# Patient Record
Sex: FEMALE | Race: WHITE | NOT HISPANIC OR LATINO | Employment: OTHER | URBAN - METROPOLITAN AREA
[De-identification: names, ages, dates, MRNs, and addresses within clinical notes are randomized per-mention and may not be internally consistent; named-entity substitution may affect disease eponyms.]

---

## 2017-06-27 ENCOUNTER — CONVERSION ENCOUNTER (OUTPATIENT)
Dept: GENERAL RADIOLOGY | Facility: HOSPITAL | Age: 57
End: 2017-06-27

## 2018-06-28 ENCOUNTER — CONVERSION ENCOUNTER (OUTPATIENT)
Dept: GENERAL RADIOLOGY | Facility: HOSPITAL | Age: 58
End: 2018-06-28

## 2018-07-13 ENCOUNTER — OFFICE VISIT CONVERTED (OUTPATIENT)
Dept: FAMILY MEDICINE CLINIC | Facility: CLINIC | Age: 58
End: 2018-07-13
Attending: FAMILY MEDICINE

## 2019-07-15 ENCOUNTER — HOSPITAL ENCOUNTER (OUTPATIENT)
Dept: OTHER | Facility: HOSPITAL | Age: 59
Discharge: HOME | End: 2019-07-15
Attending: FAMILY MEDICINE

## 2019-07-15 ENCOUNTER — OFFICE VISIT CONVERTED (OUTPATIENT)
Dept: FAMILY MEDICINE CLINIC | Facility: CLINIC | Age: 59
End: 2019-07-15
Attending: FAMILY MEDICINE

## 2019-07-15 LAB
ANION GAP SERPL CALC-SCNC: 16 MMOL/L (ref 8–19)
BUN SERPL-MCNC: 9 MG/DL (ref 5–25)
BUN/CREAT SERPL: 16 {RATIO} (ref 6–20)
CALCIUM SERPL-MCNC: 9.5 MG/DL (ref 8.7–10.4)
CHLORIDE SERPL-SCNC: 101 MMOL/L (ref 99–111)
CHOLEST SERPL-MCNC: 194 MG/DL (ref 107–200)
CHOLEST/HDLC SERPL: 2.6 {RATIO} (ref 3–6)
CONV CO2: 27 MMOL/L (ref 22–32)
CREAT UR-MCNC: 0.55 MG/DL (ref 0.5–0.9)
EST. AVERAGE GLUCOSE BLD GHB EST-MCNC: 111 MG/DL
GFR SERPLBLD BASED ON 1.73 SQ M-ARVRAT: >60 ML/MIN/{1.73_M2}
GLUCOSE SERPL-MCNC: 107 MG/DL (ref 65–99)
HBA1C MFR BLD: 5.5 % (ref 3.5–5.7)
HDLC SERPL-MCNC: 74 MG/DL (ref 40–60)
LDLC SERPL CALC-MCNC: 109 MG/DL (ref 70–100)
OSMOLALITY SERPL CALC.SUM OF ELEC: 287 MOSM/KG (ref 273–304)
POTASSIUM SERPL-SCNC: 4.6 MMOL/L (ref 3.5–5.3)
SODIUM SERPL-SCNC: 139 MMOL/L (ref 135–147)
TRIGL SERPL-MCNC: 57 MG/DL (ref 40–150)
VLDLC SERPL-MCNC: 11 MG/DL (ref 5–37)

## 2019-08-12 ENCOUNTER — HOSPITAL ENCOUNTER (OUTPATIENT)
Dept: GENERAL RADIOLOGY | Facility: HOSPITAL | Age: 59
Discharge: HOME | End: 2019-08-12
Attending: FAMILY MEDICINE

## 2020-04-01 ENCOUNTER — OFFICE VISIT CONVERTED (OUTPATIENT)
Dept: FAMILY MEDICINE CLINIC | Facility: CLINIC | Age: 60
End: 2020-04-01
Attending: INTERNAL MEDICINE

## 2020-04-01 ENCOUNTER — CONVERSION ENCOUNTER (OUTPATIENT)
Dept: FAMILY MEDICINE CLINIC | Facility: CLINIC | Age: 60
End: 2020-04-01

## 2020-07-21 ENCOUNTER — HOSPITAL ENCOUNTER (OUTPATIENT)
Dept: OTHER | Facility: HOSPITAL | Age: 60
Discharge: HOME | End: 2020-07-21
Attending: INTERNAL MEDICINE

## 2020-07-21 ENCOUNTER — OFFICE VISIT CONVERTED (OUTPATIENT)
Dept: FAMILY MEDICINE CLINIC | Facility: CLINIC | Age: 60
End: 2020-07-21
Attending: INTERNAL MEDICINE

## 2020-07-21 LAB
ALBUMIN SERPL-MCNC: 4.7 G/DL (ref 3.5–5)
ALBUMIN/GLOB SERPL: 1.8 {RATIO} (ref 1.4–2.6)
ALP SERPL-CCNC: 76 U/L (ref 43–160)
ALT SERPL-CCNC: 14 U/L (ref 10–40)
ANION GAP SERPL CALC-SCNC: 18 MMOL/L (ref 8–19)
AST SERPL-CCNC: 20 U/L (ref 15–50)
BILIRUB SERPL-MCNC: 1.16 MG/DL (ref 0.2–1.3)
BUN SERPL-MCNC: 8 MG/DL (ref 5–25)
BUN/CREAT SERPL: 14 {RATIO} (ref 6–20)
CALCIUM SERPL-MCNC: 9.6 MG/DL (ref 8.7–10.4)
CHLORIDE SERPL-SCNC: 99 MMOL/L (ref 99–111)
CHOLEST SERPL-MCNC: 194 MG/DL (ref 107–200)
CHOLEST/HDLC SERPL: 2.6 {RATIO} (ref 3–6)
CONV CO2: 25 MMOL/L (ref 22–32)
CONV TOTAL PROTEIN: 7.3 G/DL (ref 6.3–8.2)
CREAT UR-MCNC: 0.57 MG/DL (ref 0.5–0.9)
GFR SERPLBLD BASED ON 1.73 SQ M-ARVRAT: >60 ML/MIN/{1.73_M2}
GLOBULIN UR ELPH-MCNC: 2.6 G/DL (ref 2–3.5)
GLUCOSE SERPL-MCNC: 103 MG/DL (ref 65–99)
HDLC SERPL-MCNC: 76 MG/DL (ref 40–60)
LDLC SERPL CALC-MCNC: 108 MG/DL (ref 70–100)
OSMOLALITY SERPL CALC.SUM OF ELEC: 283 MOSM/KG (ref 273–304)
POTASSIUM SERPL-SCNC: 4.8 MMOL/L (ref 3.5–5.3)
SODIUM SERPL-SCNC: 137 MMOL/L (ref 135–147)
TRIGL SERPL-MCNC: 52 MG/DL (ref 40–150)
VLDLC SERPL-MCNC: 10 MG/DL (ref 5–37)

## 2020-10-15 ENCOUNTER — HOSPITAL ENCOUNTER (OUTPATIENT)
Dept: GENERAL RADIOLOGY | Facility: HOSPITAL | Age: 60
Discharge: HOME | End: 2020-10-15
Attending: INTERNAL MEDICINE

## 2021-03-16 ENCOUNTER — HOSPITAL ENCOUNTER (OUTPATIENT)
Dept: VACCINE CLINIC | Facility: HOSPITAL | Age: 61
Discharge: HOME | End: 2021-03-16
Attending: INTERNAL MEDICINE

## 2021-03-24 ENCOUNTER — OFFICE VISIT CONVERTED (OUTPATIENT)
Dept: FAMILY MEDICINE CLINIC | Facility: CLINIC | Age: 61
End: 2021-03-24
Attending: STUDENT IN AN ORGANIZED HEALTH CARE EDUCATION/TRAINING PROGRAM

## 2021-03-24 ENCOUNTER — CONVERSION ENCOUNTER (OUTPATIENT)
Dept: FAMILY MEDICINE CLINIC | Facility: CLINIC | Age: 61
End: 2021-03-24

## 2021-04-06 ENCOUNTER — HOSPITAL ENCOUNTER (OUTPATIENT)
Dept: VACCINE CLINIC | Facility: HOSPITAL | Age: 61
Discharge: HOME | End: 2021-04-06
Attending: INTERNAL MEDICINE

## 2021-05-10 NOTE — H&P
History and Physical      Patient Name: Kasey Soler   Patient ID: [id number]   Sex: Female   YOB: 1960    Primary Care Provider: Kar Castro MD    Visit Date: March 24, 2021    Provider: Kar Castro MD   Location: [facility]   Location Address: [address] [place] [address], [address]  [place], [place]  [id number]   Location Phone: [phone]          Chief Complaint     New pt here today to est care       History Of Present Illness  Kasey Soler is a 60 year old /White female who presents for evaluation and treatment of:      60 years old female with past medical history of seasonal allergies, recurrent sinusitis, comes to the clinic today to establish care/complaining of sinus pressure and follow-up on chronic conditions.    Seasonal allergies; patient is only taking Zyrtec at this time.    Patient is complaining of few days history of pressure at the maxillary and frontal sinuses, denies any ear pain, mild nasal discharge intermittently but no cough, fever, chest pain or shortness of breath.    Patient is a retired nurse.  Physically very active currently, denies any chest pain or shortness of breath on exertion.       Past Medical History  Disease Name Date Onset Notes   *No Pertinent Past Medical History --  --    History of gestational diabetes 06/15/2016 --    Screening for colon cancer --  hemoccult NL 7/2018   Screening Mammogram 8/2019 normal         Past Surgical History  Procedure Name Date Notes   *Other 4/1/2015 basal cell removed   Appendectomy 1995 --    Hysterectomy 1995 --          Medication List  Name Date Started Instructions   Zyrtec 10 mg oral tablet  take 1 tablet (10 mg) by oral route once daily         Allergy List  Allergen Name Date Reaction Notes   NO KNOWN DRUG ALLERGIES --  --  --        Allergies Reconciled  Family Medical History  Disease Name Relative/Age Notes   Lung Neoplasm, Malignant Grandfather (paternal)/   --    Family history of  kidney disease  --          Reproductive History  Menstrual   Age Menarche: 11   Pregnancy Summary   Total Pregnancies: 2 Full Term: 1 Premature: 0   Ab Induced: 0 Ab Spontaneous: 0 Ectopics: 0   Multiples: 0 Livin         Social History  Finding Status Start/Stop Quantity Notes   Alcohol Never --/-- --  --     --  --/-- --  --    Minimal Amount of Exercise (Once weekly or less) --  --/-- --  --    No known infection risk --  --/-- --  --    Tobacco Never --/-- --  --          Immunizations  NameDate Admin Mfg Trade Name Lot Number Route Inj VIS Given VIS Publication   Hepatitis A1 SKB HAVRIX-ADULT [id number] IM LD 10/29/2018 2016   Comments: Pt tolerated well.   Hepatitis A02018 SKB HAVRIX-ADULT  IM RD 2018 10/25/2011   Comments: tolerated well   Xhvqxyedf04/2019 SKB Fluzone Quadrivalent  NE NE 10/01/2019    Comments:    Zaroeizftzze81/2013 MSD PNEUMOVAX 23  IM NE 2014 10/06/2009   Comments:    [id number] SKB BOOSTRIX  IM NE 2014   Comments:          Review of Systems  · Constitutional  o Denies  o : fatigue, fever  · Eyes  o Denies  o : discharge from eye, redness  · HENT  o Admits  o : Sinus congestion, sinus pressure  o Denies  o : headaches,   · Cardiovascular  o Denies  o : chest pain, palpitations  · Respiratory  o Denies  o : shortness of breath, wheezing, cough  · Gastrointestinal  o Denies  o : vomiting, diarrhea, constipation  · Genitourinary  o Denies  o : dysuria, hematuria  · Integument  o Denies  o : rash, new skin lesions  · Neurologic  o Denies  o : altered mental status, seizures  · Musculoskeletal  o Denies  o : weakness, joint swelling  · Psychiatric  o Denies  o : anxiety, depression  · Heme-Lymph  o Denies  o : lymph node enlargement, tenderness      Vitals  Date Time BP Position Site L\R Cuff Size HR RR TEMP (F) WT  HT  BMI kg/m2 BSA m2 O2 Sat FR L/min FiO2        2021 09:15 /78 Sitting    97 - R 16 97.6 120lbs  "0oz 5'  3\" 21.26 1.56 98 %  21%          Physical Examination  · Constitutional  o Appearance  o : alert, in no acute distress, well developed, well-nourished  · Head and Face  o Head  o : normocephalic, atraumatic, non tender, no palpable masses or nodules.  o Face  o : no facial lesions  · Eyes  o Vision  o : Acuity: grossly normal at distance, Conjuntivae: Normal, Sclerae white, Pupils: PERRL, Cornea: Clear, no lesions bilateral  · Ears, Nose, Mouth and Throat  o Ears  o : Ext. Ears: Normal shape, Non tender, EACs: Normal , TMs: Mildly congested, Hearing: intact to conversational voice bilaterally  o Nose  o : Nose: Normal shape, No external deformity, No nasal discharge, Mucosa: Inflamed and swollen, Septum: midline, Sinuses: Nontender   o Oral Cavity  o : Normal oral mucosa, Normal lips, Gums: normal pink, non swollen, Tongue: Normal appearance, Palate : Normal   o Throat  o : Oropharynx: Mildly inflamed, Tonsils: within normal limits  · Neck  o Inspection/Palpation  o : Supple, no masses or tenderness, no deformities, Trachea: Midline, ROM: with in normal limits, no neck stiffness  o Thyroid  o : no thyomegaly, no palpabale masses   · Respiratory  o Auscultation of Lungs  o : normal breath sounds throughout  · Cardiovascular  o Heart  o : Regular rate and rhythm, Normal S1,S2 , No cardiac murmers, No S3 or S4 gallop or rubs  · Gastrointestinal  o Abdominal Examination  o : abdomen soft, nontender, non distended, no rigidity, gaurding, rebound tenderness, no ventral or inguinal hernias present  o Liver and spleen  o : no hepatomegaly present, liver nontender to palpation, spleen not palpable  · Neurologic  o Mental Status Examination  o : alert and oriented to time, place, and person., Cranial Nerves: grossly intact,  · Psychiatric  o Mood and Affect  o : normal mood and affect          Assessment  · Screening for depression     V79.0/Z13.89  · Establishing care with new doctor, encounter " for     V65.8/Z76.89  · Sinusitis     473.9/J32.9  · PND (post-nasal drip)     784.91/R09.82  · Seasonal allergies     477.9/J30.2  · Recurrent sinusitis     473.9/J32.9       --Patient was advised to continue with Zyrtec and start Flonase    We will prescribe azithromycin; delayed prescription method used; no need to take it at this time but if no improvement or worsening, patient to call the clinic and start taking antibiotics.    Prior PCP records reviewed    Total time spent 30 minutes       Plan  · Orders  o Annual depression screening, 15 minutes (, [address]) - V79.0/Z13.89 - 03/24/2021  o ACO-18: Negative screen for clinical depression using a standardized tool () - V79.0/Z13.89 - 03/24/2021  o ACO-18: Negative screen for clinical depression using a standardized tool () - - 03/24/2021  o ACO-14: Influenza immunization administered or previously received [facility] () - - 03/24/2021  o ACO-39: Current medications updated and reviewed (, 1159F) - - 03/24/2021  · Medications  o azithromycin 250 mg oral tablet   SIG: take 2 tablets (500 mg) by oral route once daily for 1 day then 1 tablet (250 mg) by oral route once daily for 4 days   DISP: (6) Tablet with 0 refills  Prescribed on 03/24/2021     o Augmentin 875-125 mg oral tablet   SIG: take 1 tablet by oral route every 12 hours for 7 days   DISP: (14) tablets with 0 refills  Discontinued on 03/24/2021     o fluticasone 50 mcg/actuation nasal spray,suspension   SIG: spray 1 - 2 sprays in each nostril by intranasal route once daily   DISP: (1) 9.9 ml aer w/adap with 5 refills  Discontinued on 03/24/2021     o Medications have been Reconciled  o Transition of Care or Provider Policy  · Instructions  o Depression Screen completed and scanned into the EMR under the designated folder within the patient's documents.  o Today's PHQ-9 result is __0_  o The provider screening met the required time of 15 minutes.  o Patient was educated/instructed on their  diagnosis, treatment and medications prior to discharge from the clinic today.  o Patient was instructed to exercise regularly.  o Patient instructed to seek medical attention urgently for new or worsening symptoms.  o Call the office with any concerns or questions.  o Bring all medicines with their bottles to each office visit.  o Minutes spent with patient including greater than 50% in Education/Counseling/Care Coordination.  o Time spent with the patient was minutes, more than 50% face to face.  o Discussed Covid-19 precautions including, but not limited to, social distancing, avoid touching your face, and hand washing.   · Disposition  o Call or Return if symptoms worsen or persist.  o Follow Up PRN.            Electronically Signed by: Kar Castro MD -Author on March 24, 2021 11:01:00 AM

## 2021-05-12 NOTE — PROGRESS NOTES
Progress Note      Patient Name: Kasey Soler   Patient ID: [id number]   Sex: Female   YOB: 1960    Primary Care Provider: Noel Hutchins DO    Visit Date: April 1, 2020    Provider: Noel Hutchins DO   Location: [facility]   Location Address: [address] [place] [address], [address]  [place], [place]  [id number]   Location Phone: [phone]          Chief Complaint  · Possible pink eye left      History Of Present Illness  Kasey Soler is a 59 year old /White female who presents for evaluation and treatment of:      Pt is here for possible pink eye.    Pt states that her symptoms started yesterday with drainage, itching and very painful. Pt states that her left eyelid swollen today. Pt states that she used eye drops the she had previous for her pink eye but it wasn't enough.    Pt states that she having little bit of sinus infection with pressure in her forehead, right side of face and her teeth. Pt states that her headache comes and goes in the morning. Pt denies drainage, dizziness, or soa.       Past Medical History  Disease Name Date Onset Notes   *No Pertinent Past Medical History --  --    History of gestational diabetes 06/15/2016 --    Screening for colon cancer --  hemoccult NL 7/2018   Screening Mammogram 8/2019 normal         Past Surgical History  Procedure Name Date Notes   *Other 4/1/2015 basal cell removed   Appendectomy 1995 --    Hysterectomy 1995 --          Medication List  Name Date Started Instructions   fluticasone 50 mcg/actuation nasal spray,suspension 07/13/2018 spray 1 - 2 sprays in each nostril by intranasal route once daily   Zyrtec 10 mg oral tablet  take 1 tablet (10 mg) by oral route once daily         Allergy List  Allergen Name Date Reaction Notes   NO KNOWN DRUG ALLERGIES --  --  --          Family Medical History  Disease Name Relative/Age Notes   Lung Neoplasm, Malignant Grandfather (paternal)/   --          Reproductive History  Menstrual   Age  Menarche: 11   Pregnancy Summary   Total Pregnancies: 2 Full Term: 1 Premature: 0   Ab Induced: 0 Ab Spontaneous: 0 Ectopics: 0   Multiples: 0 Livin         Social History  Finding Status Start/Stop Quantity Notes   Alcohol Never --/-- --  --     --  --/-- --  --    Minimal Amount of Exercise (Once weekly or less) --  --/-- --  --    No known infection risk --  --/-- --  --    Tobacco Never --/-- --  --          Immunizations  NameDate Admin Mfg Trade Name Lot Number Route Inj VIS Given VIS Publication   Hepatitis A1 SKB HAVRIX-ADULT [id number] IM LD 10/29/2018 2016   Comments: Pt tolerated well.   Hepatitis A02018 SKB HAVRIX-ADULT  IM RD 2018 10/25/2011   Comments: tolerated well   Cfofvkzgw49/2019 [facility] Fluzone Quadrivalent  NE NE 10/01/2019    Comments:    [place]l60/ [facility] Fluzone Quadrivalent [id number] IM RD 10/29/2018 2015   Comments: Pt tolerated well.   Gitcncnnb35/2013 SKB Fluarix-PF > 3 Years [id number] IM NE 2014   Comments:    Kwayskdcznjn48/2013 [facility] PNEUMOVAX 23  IM NE 2014 10/06/2009   Comments:    [id number] SKB BOOSTRIX  IM NE 2014   Comments:          Review of Systems  · Constitutional  o Denies  o : fatigue, night sweats  · Eyes  o Admits  o : discharge from eye, eye discomfort, eye pain  o Denies  o : double vision, blurred vision  · HENT  o Admits  o : headaches, sinus pain, nasal congestion, nasal discharge, postnasal drip  o Denies  o : vertigo, recent head injury, sore throat, ear pain, ear fullness  · Cardiovascular  o Denies  o : chest pain, irregular heart beats  · Respiratory  o Denies  o : shortness of breath, productive cough  · Gastrointestinal  o Denies  o : nausea, vomiting  · Genitourinary  o Denies  o : dysuria, urinary retention  · Integument  o Denies  o : hair growth change, new skin lesions  · Neurologic  o Denies  o : altered mental status, seizures  · Musculoskeletal  o Denies  o :  "joint swelling, limitation of motion  · Endocrine  o Denies  o : cold intolerance, heat intolerance  · Psychiatric  o Denies  o : anxiety, depression  · Heme-Lymph  o Denies  o : petechiae, lymph node enlargement or tenderness  · Allergic-Immunologic  o Denies  o : frequent illnesses      Vitals  Date Time BP Position Site L\R Cuff Size HR RR TEMP (F) WT  HT  BMI kg/m2 BSA m2 O2 Sat        07/15/2019 08:59 /84 Sitting    82 - R   120lbs 16oz 5'  3.5\" 21.1 1.57 99 %    04/01/2020 08:12 /72 Sitting    72 - R   128lbs 0oz 5'  3\" 22.67 1.61 96 %          Physical Examination  · Constitutional  o Appearance  o : alert, oriented, in no acute distress, well developed, well-nourished  · Eyes  o Vision  o : Conjuntivae: injected on left Sclerae injected left, Pupils: PERRL, Cornea: Clear, no lesions bilateral, slight discharge left eye, left eyelid swelling  · Ears, Nose, Mouth and Throat  o Ears  o : Ext. Ears: Normal shape, Non tender, EACs: Normal , Tragus intact bilaterally, Hearing: intact to conversational voice bilaterally  o Nose  o : No nasal discharge, Mucosa: erythema, Septum: midline, Sinuses: maxillary sinus TTP  o Throat  o : Oropharynx: clear post nasal drainage and mild erythema posterior oropharynx  · Neck  o Inspection/Palpation  o : Supple, no masses or tenderness, no deformities, Trachea: Midline, ROM: with in normal limits, no neck stiffness, no lymphadenopathy  o Thyroid  o : no thyomegaly, no palpabale masses   · Respiratory  o Auscultation of Lungs  o : normal breath sounds throughout, no wheeze, rhonchi, or crackles  · Cardiovascular  o Heart  o : Regular rate and rhythm, Normal S1,S2 , No cardiac murmers, No S3 or S4 gallop or rubs  · Gastrointestinal  o Abdominal Examination  o : abdomen soft, nontender, non distended, no rigidity, gaurding, rebound tenderness, no ventral hernias present  o Liver and spleen  o : no hepatomegaly present, liver nontender to palpation, spleen not " palpable  · Skin and Subcutaneous Tissue  o General Inspection  o : no rashes on visible skin, normal skin color, warm and dry  o Digits and Nails  o : no clubbing, cyanosis, deformities or edema present, normal appearing nails  · Neurologic  o Mental Status Examination  o : alert and oriented to time, place, and person. Gait and Station: normal gait, able to stand without difficulty. CN 2-12 grossly intact   · Psychiatric  o Judgment and Insight  o : judgment and insight intact  o Mood and Affect  o : normal mood and affect          Assessment  · Acute recurrent maxillary sinusitis     461.0/J01.01  States she normally gets these at beginning of spring. Instructed her to start taking Zyrtec before seasonal changes to help prevent infections. Treat with seven day course of Augmentin with food. Recommended probiotic.  · Pink eye, left     372.03/H10.022  Cipro eye drops given.      Plan  · Orders  o ACO-39: Current medications updated and reviewed () - - 04/01/2020  o ACO-14: Influenza immunization administered or previously received () - - 04/01/2020  o ACO - Pt declines to or was not able to provide an Advance Care Plan or name a Surrogate Decision Maker (1124F) - - 04/01/2020  · Medications  o Augmentin 875-125 mg oral tablet   SIG: take 1 tablet by oral route every 12 hours for 7 days   DISP: (14) tablets with 0 refills  Prescribed on 04/01/2020     o Ciloxan 0.3 % ophthalmic (eye) drops   SIG: instill 2 drops into left eye by ophthalmic route every 4 hours around the clock for 12 days   DISP: (1) 5 ml drop btl with 0 refills  Prescribed on 04/01/2020     o Augmentin 875-125 mg oral tablet   SIG: take 1 tablet by oral route every 12 hours for 10 days   DISP: (20) tablets with 0 refills  Discontinued on 04/01/2020     o Diflucan 150 mg oral tablet   SIG: take 1 tablet (150 mg) by oral route once   DISP: (1) tablet with 0 refills  Discontinued on 04/01/2020     o prednisone 20 mg oral tablet   SIG: take 2  tablets (40 mg) by oral route once daily for 5 days   DISP: (10) tablets with 0 refills  Discontinued on 04/01/2020     o Medications have been Reconciled  o Transition of Care or Provider Policy  · Instructions  o Take all medications as prescribed/directed.  o Patient was educated/instructed on their diagnosis, treatment and medications prior to discharge from the clinic today.  o Patient instructed to seek medical attention urgently for new or worsening symptoms.  o Call the office with any concerns or questions.  o Bring all medicines with their bottles to each office visit.  o Time spent with the patient was minutes, more than 50% face to face.  o Chronic conditions reviewed and taken into consideration for today's treatment plan.  o Electronically Identified Patient Education Materials Provided Electronically  · Disposition  o Call or Return if symptoms worsen or persist.            Electronically Signed by: Noel Hutchins DO -Author on April 1, 2020 09:58:57 AM

## 2021-05-13 NOTE — PROGRESS NOTES
Progress Note      Patient Name: Kasey Soler   Patient ID: [id number]   Sex: Female   YOB: 1960    Primary Care Provider: Noel Hutchins DO    Visit Date: 2020    Provider: Noel Hutchins DO   Location: [facility]   Location Address: [address] [place] [address], [address]  [place], [place]  [id number]   Location Phone: [phone]          Chief Complaint  · Physical      History Of Present Illness  Kasey Soler is a 60 year old /White female who presents for evaluation and treatment of:      Pt is here for physical.    Pt states that she never had  colonoscopy.     Pt states that she hasn't had any blood work done since 2019.    Pt also states that she only takes Fluticasone and Zyrtec prn.         Past Medical History  Disease Name Date Onset Notes   *No Pertinent Past Medical History --  --    History of gestational diabetes 06/15/2016 --    Screening for colon cancer --  hemoccult NL 2018   Screening Mammogram 2019 normal         Past Surgical History  Procedure Name Date Notes   *Other 2015 basal cell removed   Appendectomy  --    Hysterectomy  --          Medication List  Name Date Started Instructions   fluticasone 50 mcg/actuation nasal spray,suspension 2018 spray 1 - 2 sprays in each nostril by intranasal route once daily   Zyrtec 10 mg oral tablet  take 1 tablet (10 mg) by oral route once daily         Allergy List  Allergen Name Date Reaction Notes   NO KNOWN DRUG ALLERGIES --  --  --          Family Medical History  Disease Name Relative/Age Notes   Lung Neoplasm, Malignant Grandfather (paternal)/   --          Reproductive History  Menstrual   Age Menarche: 11   Pregnancy Summary   Total Pregnancies: 2 Full Term: 1 Premature: 0   Ab Induced: 0 Ab Spontaneous: 0 Ectopics: 0   Multiples: 0 Livin         Social History  Finding Status Start/Stop Quantity Notes   Alcohol Never --/-- --  --     --  --/-- --  --    Minimal Amount of  Exercise (Once weekly or less) --  --/-- --  --    No known infection risk --  --/-- --  --    Tobacco Never --/-- --  --          Immunizations  NameDate Admin Mfg Trade Name Lot Number Route Inj VIS Given VIS Publication   Hepatitis A10/29/2018 [facility] HAVRIX-ADULT [id number] IM LD 10/29/2018 07/20/2016   Comments: Pt tolerated well.   Hepatitis A04/27/2018 [facility] HAVRIX-ADULT  IM RD 04/27/2018 10/25/2011   Comments: tolerated well   Fbagohkda45/01/2019 [facility] Fluzone Quadrivalent  NE NE 10/01/2019    Comments:    Itjajepvx71/29/2018 [facility] Fluzone Quadrivalent [id number] IM RD 10/29/2018 08/07/2015   Comments: Pt tolerated well.   Nioknzbyy22/01/2013 [facility] Fluarix-PF > 3 Years [id number] IM NE 02/14/2014 07/02/2012   Comments:    Tshngarcetnz34/01/2013 [facility] PNEUMOVAX 23  IM NE 02/14/2014 10/06/2009   Comments:    Tdap11/01/2011 [facility] BOOSTRIX  IM NE 02/14/2014 01/24/2012   Comments:          Review of Systems  · Constitutional  o Denies  o : fatigue, night sweats  · Eyes  o Denies  o : double vision, blurred vision  · HENT  o Admits  o : nasal congestion, postnasal drip  o Denies  o : vertigo, recent head injury  · Cardiovascular  o Denies  o : chest pain, irregular heart beats  · Respiratory  o Denies  o : shortness of breath, productive cough  · Gastrointestinal  o Denies  o : nausea, vomiting  · Genitourinary  o Denies  o : dysuria, urinary retention  · Integument  o Denies  o : hair growth change, new skin lesions  · Neurologic  o Denies  o : altered mental status, seizures  · Musculoskeletal  o Denies  o : joint swelling, limitation of motion  · Endocrine  o Denies  o : cold intolerance, heat intolerance  · Psychiatric  o Denies  o : anxiety, depression  · Heme-Lymph  o Denies  o : petechiae, lymph node enlargement or tenderness  · Allergic-Immunologic  o Denies  o : frequent illnesses      Vitals  Date Time BP Position Site L\R Cuff Size HR RR TEMP (F) WT  HT  BMI kg/m2 BSA m2 O2 Sat HC       04/01/2020 08:12 /72 Sitting    72  "- R   128lbs 0oz 5'  3\" 22.67 1.61 96 %    07/21/2020 08:14 /66 Sitting    76 - R   121lbs 8oz 5'  3\" 21.52 1.57 97 %          Physical Examination  · Constitutional  o Appearance  o : alert, oriented, in no acute distress, well developed, well-nourished  · Eyes  o Vision  o : Conjuntivae: Normal, Sclerae white, Pupils: PERRL, Cornea: Clear, no lesions bilateral  · Ears, Nose, Mouth and Throat  o Ears  o : Ext. Ears: Normal shape, Non tender, EACs: Normal , Tragus intact bilaterally, Hearing: intact to conversational voice bilaterally  o Nose  o : No nasal discharge, Mucosa: erythematous, Septum: midline, Sinuses: ttp in frontal sinuses  o Throat  o : Oropharynx: clear post nasal drainage, mild erythema posterior oropharynx  · Neck  o Inspection/Palpation  o : Supple, no masses or tenderness, no deformities, Trachea: Midline, ROM: with in normal limits, no neck stiffness, no lymphadenopathy  o Thyroid  o : no thyomegaly, no palpabale masses   · Respiratory  o Auscultation of Lungs  o : normal breath sounds throughout, no wheeze, rhonchi, or crackles  · Cardiovascular  o Heart  o : Regular rate and rhythm, Normal S1,S2 , No cardiac murmers, No S3 or S4 gallop or rubs  · Gastrointestinal  o Abdominal Examination  o : abdomen soft, nontender, non distended, no rigidity, gaurding, rebound tenderness, no ventral hernias present  o Liver and spleen  o : no hepatomegaly present, liver nontender to palpation, spleen not palpable  · Skin and Subcutaneous Tissue  o General Inspection  o : no rashes on visible skin, normal skin color, warm and dry  o Digits and Nails  o : no clubbing, cyanosis, deformities or edema present, normal appearing nails  · Neurologic  o Mental Status Examination  o : alert and oriented to time, place, and person. Gait and Station: normal gait, able to stand without difficulty. CN 2-12 grossly intact   · Psychiatric  o Judgment and Insight  o : judgment and insight intact  o Mood and Affect  o : " normal mood and affect          Assessment  · Screening for depression     V79.0/Z13.89  · Visit for screening mammogram     V76.12/Z12.31  · Annual physical exam     V70.0/Z00.00  · Allergic rhinitis due to allergen     477.9/J30.9  · Acute non-recurrent frontal sinusitis     461.1/J01.10  · Physical exam, annual     V70.0/Z00.00    Problems Reconciled  Plan  · Orders  o Screening Mammography; Bilateral 3D (54027, , 62647) - V76.12/Z12.31 - 07/21/2020  o ACO - Pt declines to or was not able to provide an Advance Care Plan or name a Surrogate Decision Maker (1124F) - - 07/21/2020  o CMP [facility] (23117) - V70.0/Z00.00 - 07/21/2020  o Lipid Panel [facility] (70715) - V70.0/Z00.00 - 07/21/2020  o ACO-39: Current medications updated and reviewed () - - 07/21/2020  o ACO-18: Negative screen for clinical depression using a standardized tool () - - 07/21/2020  · Medications  o Augmentin 875-125 mg oral tablet   SIG: take 1 tablet by oral route every 12 hours for 7 days   DISP: (14) tablets with 0 refills  Prescribed on 07/21/2020     o Medications have been Reconciled  o Transition of Care or Provider Policy  · Instructions  o Depression Screen completed and scanned into the EMR under the designated folder within the patient's documents.  o Today's PHQ-9 result is _0__  o Reviewed health maintenance flowsheet and updated information. Orders were placed and/or patient's response was documented.  o Take all medications as prescribed/directed.  o Patient was educated/instructed on their diagnosis, treatment and medications prior to discharge from the clinic today.  o Patient was instructed to exercise regularly.  o Patient instructed to seek medical attention urgently for new or worsening symptoms.  o Call the office with any concerns or questions.  o Bring all medicines with their bottles to each office visit.  o Minutes spent with patient including greater than 50% in Education/Counseling/Care Coordination.  o Time  spent with the patient was minutes, more than 50% face to face.  o Chronic conditions reviewed and taken into consideration for today's treatment plan.  o Discussed Covid-19 precautions including, but not limited to, social distancing, avoid touching your face, and hand washing.   o Electronically Identified Patient Education Materials Provided Electronically  · Disposition  o Follow up in one year.            Electronically Signed by: Noel Hutchins DO -Author on July 28, 2020 08:52:37 AM

## 2021-05-14 VITALS
SYSTOLIC BLOOD PRESSURE: 110 MMHG | WEIGHT: 120 LBS | OXYGEN SATURATION: 98 % | DIASTOLIC BLOOD PRESSURE: 78 MMHG | RESPIRATION RATE: 16 BRPM | HEIGHT: 63 IN | TEMPERATURE: 97.6 F | BODY MASS INDEX: 21.26 KG/M2 | HEART RATE: 97 BPM

## 2021-05-15 VITALS
HEIGHT: 63 IN | SYSTOLIC BLOOD PRESSURE: 128 MMHG | DIASTOLIC BLOOD PRESSURE: 72 MMHG | OXYGEN SATURATION: 96 % | BODY MASS INDEX: 22.68 KG/M2 | WEIGHT: 128 LBS | HEART RATE: 72 BPM

## 2021-05-15 VITALS
OXYGEN SATURATION: 99 % | HEART RATE: 82 BPM | SYSTOLIC BLOOD PRESSURE: 122 MMHG | WEIGHT: 121 LBS | BODY MASS INDEX: 21.44 KG/M2 | DIASTOLIC BLOOD PRESSURE: 84 MMHG | HEIGHT: 63 IN

## 2021-05-15 VITALS
HEIGHT: 63 IN | OXYGEN SATURATION: 97 % | BODY MASS INDEX: 21.53 KG/M2 | SYSTOLIC BLOOD PRESSURE: 115 MMHG | DIASTOLIC BLOOD PRESSURE: 66 MMHG | WEIGHT: 121.5 LBS | HEART RATE: 76 BPM

## 2021-05-16 VITALS
HEIGHT: 63 IN | DIASTOLIC BLOOD PRESSURE: 72 MMHG | SYSTOLIC BLOOD PRESSURE: 116 MMHG | WEIGHT: 117 LBS | HEART RATE: 73 BPM | BODY MASS INDEX: 20.73 KG/M2

## 2021-09-21 ENCOUNTER — TELEPHONE (OUTPATIENT)
Dept: FAMILY MEDICINE CLINIC | Facility: CLINIC | Age: 61
End: 2021-09-21

## 2021-09-21 DIAGNOSIS — Z12.31 ENCOUNTER FOR SCREENING MAMMOGRAM FOR MALIGNANT NEOPLASM OF BREAST: Primary | ICD-10-CM

## 2021-09-21 DIAGNOSIS — Z00.00 ANNUAL PHYSICAL EXAM: ICD-10-CM

## 2021-09-21 NOTE — TELEPHONE ENCOUNTER
Caller: Kasey Soler    Relationship: Self    Best call back number: [phone]    What orders are you requesting (i.e. lab or imaging): BLOOD WORK, MAMMORGRAM    In what timeframe would the patient need to come in: BEFORE APPOINTMENT ON 9/24 FRIDAY        Additional notes: PATIENT WOULD LIKE TO KNOW IF LABS ARE NEEDED BEFORE HER PHYSICAL ON 9/24. PLEASE ADVISE    PATIENT NEEDS ORDERS FOR HER YEARLY MAMMOGRAM.

## 2021-09-22 ENCOUNTER — TRANSCRIBE ORDERS (OUTPATIENT)
Dept: ADMINISTRATIVE | Facility: HOSPITAL | Age: 61
End: 2021-09-22

## 2021-09-22 DIAGNOSIS — Z12.31 ENCOUNTER FOR SCREENING MAMMOGRAM FOR BREAST CANCER: Primary | ICD-10-CM

## 2021-09-23 ENCOUNTER — LAB (OUTPATIENT)
Dept: LAB | Facility: HOSPITAL | Age: 61
End: 2021-09-23

## 2021-09-23 DIAGNOSIS — Z00.00 ANNUAL PHYSICAL EXAM: ICD-10-CM

## 2021-09-23 LAB
ALBUMIN SERPL-MCNC: 4.8 G/DL (ref 3.5–5.2)
ALBUMIN/GLOB SERPL: 1.9 G/DL
ALP SERPL-CCNC: 73 U/L (ref 39–117)
ALT SERPL W P-5'-P-CCNC: 15 U/L (ref 1–33)
ANION GAP SERPL CALCULATED.3IONS-SCNC: 11.6 MMOL/L (ref 5–15)
AST SERPL-CCNC: 21 U/L (ref 1–32)
BASOPHILS # BLD AUTO: 0.02 10*3/MM3 (ref 0–0.2)
BASOPHILS NFR BLD AUTO: 0.5 % (ref 0–1.5)
BILIRUB SERPL-MCNC: 1 MG/DL (ref 0–1.2)
BUN SERPL-MCNC: 12 MG/DL (ref 8–23)
BUN/CREAT SERPL: 36.4 (ref 7–25)
CALCIUM SPEC-SCNC: 9.7 MG/DL (ref 8.6–10.5)
CHLORIDE SERPL-SCNC: 100 MMOL/L (ref 98–107)
CHOLEST SERPL-MCNC: 214 MG/DL (ref 0–200)
CO2 SERPL-SCNC: 23.4 MMOL/L (ref 22–29)
CREAT SERPL-MCNC: 0.33 MG/DL (ref 0.57–1)
DEPRECATED RDW RBC AUTO: 39.2 FL (ref 37–54)
EOSINOPHIL # BLD AUTO: 0.11 10*3/MM3 (ref 0–0.4)
EOSINOPHIL NFR BLD AUTO: 2.9 % (ref 0.3–6.2)
ERYTHROCYTE [DISTWIDTH] IN BLOOD BY AUTOMATED COUNT: 12.6 % (ref 12.3–15.4)
GFR SERPL CREATININE-BSD FRML MDRD: >150 ML/MIN/1.73
GLOBULIN UR ELPH-MCNC: 2.5 GM/DL
GLUCOSE SERPL-MCNC: 135 MG/DL (ref 65–99)
HBA1C MFR BLD: 5.4 % (ref 4.8–5.6)
HCT VFR BLD AUTO: 39.6 % (ref 34–46.6)
HDLC SERPL-MCNC: 73 MG/DL (ref 40–60)
HGB BLD-MCNC: 13.6 G/DL (ref 12–15.9)
IMM GRANULOCYTES # BLD AUTO: 0 10*3/MM3 (ref 0–0.05)
IMM GRANULOCYTES NFR BLD AUTO: 0 % (ref 0–0.5)
LDLC SERPL CALC-MCNC: 130 MG/DL (ref 0–100)
LDLC/HDLC SERPL: 1.76 {RATIO}
LYMPHOCYTES # BLD AUTO: 1.09 10*3/MM3 (ref 0.7–3.1)
LYMPHOCYTES NFR BLD AUTO: 29.2 % (ref 19.6–45.3)
MCH RBC QN AUTO: 30 PG (ref 26.6–33)
MCHC RBC AUTO-ENTMCNC: 34.3 G/DL (ref 31.5–35.7)
MCV RBC AUTO: 87.4 FL (ref 79–97)
MONOCYTES # BLD AUTO: 0.29 10*3/MM3 (ref 0.1–0.9)
MONOCYTES NFR BLD AUTO: 7.8 % (ref 5–12)
NEUTROPHILS NFR BLD AUTO: 2.22 10*3/MM3 (ref 1.7–7)
NEUTROPHILS NFR BLD AUTO: 59.6 % (ref 42.7–76)
NRBC BLD AUTO-RTO: 0 /100 WBC (ref 0–0.2)
PLATELET # BLD AUTO: 276 10*3/MM3 (ref 140–450)
PMV BLD AUTO: 9.8 FL (ref 6–12)
POTASSIUM SERPL-SCNC: 4.3 MMOL/L (ref 3.5–5.2)
PROT SERPL-MCNC: 7.3 G/DL (ref 6–8.5)
RBC # BLD AUTO: 4.53 10*6/MM3 (ref 3.77–5.28)
SODIUM SERPL-SCNC: 135 MMOL/L (ref 136–145)
TRIGL SERPL-MCNC: 63 MG/DL (ref 0–150)
TSH SERPL DL<=0.05 MIU/L-ACNC: 1.71 UIU/ML (ref 0.27–4.2)
VLDLC SERPL-MCNC: 11 MG/DL (ref 5–40)
WBC # BLD AUTO: 3.73 10*3/MM3 (ref 3.4–10.8)

## 2021-09-23 PROCEDURE — 83036 HEMOGLOBIN GLYCOSYLATED A1C: CPT

## 2021-09-23 PROCEDURE — 85025 COMPLETE CBC W/AUTO DIFF WBC: CPT

## 2021-09-23 PROCEDURE — 80061 LIPID PANEL: CPT

## 2021-09-23 PROCEDURE — 36415 COLL VENOUS BLD VENIPUNCTURE: CPT

## 2021-09-23 PROCEDURE — 80053 COMPREHEN METABOLIC PANEL: CPT

## 2021-09-23 PROCEDURE — 84443 ASSAY THYROID STIM HORMONE: CPT

## 2021-09-24 ENCOUNTER — OFFICE VISIT (OUTPATIENT)
Dept: FAMILY MEDICINE CLINIC | Facility: CLINIC | Age: 61
End: 2021-09-24

## 2021-09-24 VITALS
TEMPERATURE: 97.6 F | RESPIRATION RATE: 16 BRPM | WEIGHT: 123 LBS | HEIGHT: 63 IN | BODY MASS INDEX: 21.79 KG/M2 | HEART RATE: 72 BPM | DIASTOLIC BLOOD PRESSURE: 80 MMHG | SYSTOLIC BLOOD PRESSURE: 122 MMHG | OXYGEN SATURATION: 96 %

## 2021-09-24 DIAGNOSIS — Z23 NEEDS FLU SHOT: ICD-10-CM

## 2021-09-24 DIAGNOSIS — J30.2 SEASONAL ALLERGIES: ICD-10-CM

## 2021-09-24 DIAGNOSIS — Z00.00 ANNUAL PHYSICAL EXAM: Primary | ICD-10-CM

## 2021-09-24 DIAGNOSIS — Z12.11 COLON CANCER SCREENING: ICD-10-CM

## 2021-09-24 PROCEDURE — 90471 IMMUNIZATION ADMIN: CPT | Performed by: STUDENT IN AN ORGANIZED HEALTH CARE EDUCATION/TRAINING PROGRAM

## 2021-09-24 PROCEDURE — 90686 IIV4 VACC NO PRSV 0.5 ML IM: CPT | Performed by: STUDENT IN AN ORGANIZED HEALTH CARE EDUCATION/TRAINING PROGRAM

## 2021-09-24 PROCEDURE — 99396 PREV VISIT EST AGE 40-64: CPT | Performed by: STUDENT IN AN ORGANIZED HEALTH CARE EDUCATION/TRAINING PROGRAM

## 2021-09-24 RX ORDER — CETIRIZINE HYDROCHLORIDE 10 MG/1
TABLET ORAL
COMMUNITY

## 2021-09-24 NOTE — PROGRESS NOTES
"Chief Complaint  Annual wellness visit and blood work follow-up    Subjective         Kasey Soler is a 61 y.o. female who presents to [facility]    61 years old female comes to the clinic today for annual wellness visit and blood work follow-up.    Patient reports no acute concerns at this time.  Patient is taking only Zyrtec for seasonal allergy.    Patient is physically very active, denies any chest pain or shortness of breath on exertion.  Want to discuss preventive medicine.    Patient had blood work done this week; reviewed, positive for mild hyperlipidemia.  Review of Systems   Objective   Vital Signs:   Vitals:    09/24/21 0732   BP: 122/80   Pulse: 72   Resp: 16   Temp: 97.6 °F (36.4 °C)   SpO2: 96%   Weight: 55.8 kg (123 lb)   Height: 160 cm (63\")      Body mass index is 21.79 kg/m².   Physical Exam  Vitals reviewed.   Constitutional:       Appearance: Normal appearance. She is well-developed.   HENT:      Head: Normocephalic and atraumatic.      Right Ear: External ear normal.      Left Ear: External ear normal.      Mouth/Throat:      Pharynx: No oropharyngeal exudate.   Eyes:      Conjunctiva/sclera: Conjunctivae normal.      Pupils: Pupils are equal, round, and reactive to light.   Cardiovascular:      Rate and Rhythm: Normal rate and regular rhythm.      Heart sounds: No murmur heard.   No friction rub. No gallop.    Pulmonary:      Effort: Pulmonary effort is normal.      Breath sounds: Normal breath sounds. No wheezing or rhonchi.   Abdominal:      General: Bowel sounds are normal. There is no distension.      Palpations: Abdomen is soft.      Tenderness: There is no abdominal tenderness.   Skin:     General: Skin is warm and dry.   Neurological:      Mental Status: She is alert and oriented to person, place, and time.      Cranial Nerves: No cranial nerve deficit.   Psychiatric:         Mood and Affect: Mood and affect normal.         Behavior: Behavior " normal.         Thought Content: Thought content normal.         Judgment: Judgment normal.                       Assessment and Plan   Diagnoses and all orders for this visit:    1. Annual physical exam (Primary)    2. Colon cancer screening  -     Cologuard - Stool, Per Rectum; Future    3. Needs flu shot  -     FluLaval/Fluarix >6 Months (2761-6670)    4. Seasonal allergies      Patient was given flu vaccine today in the clinic.  Pap smear scheduled with female physician  Cologuard ordered  Mammogram pending  Patient declined pneumococcal had shingles vaccine last year.  Recent blood work reviewed/discussed and copy given    Healthy diet and daily exercise discussed, continue with Zyrtec for seasonal allergies      Follow Up   Return in about 4 weeks (around 10/22/2021), or if symptoms worsen or fail to improve, for need PAP with Jareth next month .  Patient was given instructions and counseling regarding her condition or for health maintenance advice. Please see specific information pulled into the AVS if appropriate.

## 2021-10-25 ENCOUNTER — OFFICE VISIT (OUTPATIENT)
Dept: FAMILY MEDICINE CLINIC | Facility: CLINIC | Age: 61
End: 2021-10-25

## 2021-10-25 VITALS
TEMPERATURE: 97.6 F | DIASTOLIC BLOOD PRESSURE: 77 MMHG | BODY MASS INDEX: 21.83 KG/M2 | RESPIRATION RATE: 16 BRPM | SYSTOLIC BLOOD PRESSURE: 138 MMHG | HEART RATE: 67 BPM | OXYGEN SATURATION: 96 % | WEIGHT: 123.2 LBS | HEIGHT: 63 IN

## 2021-10-25 DIAGNOSIS — N95.1 POSTMENOPAUSAL SYNDROME: Primary | ICD-10-CM

## 2021-10-25 DIAGNOSIS — Z12.11 SCREENING FOR COLON CANCER: ICD-10-CM

## 2021-10-25 DIAGNOSIS — Z01.419 ENCOUNTER FOR GYNECOLOGICAL EXAMINATION (GENERAL) (ROUTINE) WITHOUT ABNORMAL FINDINGS: ICD-10-CM

## 2021-10-25 LAB
DEVELOPER EXPIRATION DATE: NORMAL
DEVELOPER LOT NUMBER: NORMAL
EXPIRATION DATE: NORMAL
FECAL OCCULT BLOOD SCREEN, POC: NEGATIVE
Lab: NORMAL
NEGATIVE CONTROL: NEGATIVE
POSITIVE CONTROL: POSITIVE

## 2021-10-25 PROCEDURE — 82270 OCCULT BLOOD FECES: CPT | Performed by: NURSE PRACTITIONER

## 2021-10-25 PROCEDURE — 99396 PREV VISIT EST AGE 40-64: CPT | Performed by: NURSE PRACTITIONER

## 2021-10-25 PROCEDURE — G0148 SCR C/V CYTO, AUTOSYS, RESCR: HCPCS | Performed by: NURSE PRACTITIONER

## 2021-10-25 NOTE — PROGRESS NOTES
"Chief Complaint  Gynecologic Exam    Subjective        Kasey Soler presents to [facility]  Here for Gyn exam.  Last Pap was 5 years ago.  Hysterectomy was 1995 that was not due to cancer.Was removed with fibroids.Partial hysterectomy.        Past History:    Medical History: has a past medical history of Allergic, Colon cancer screening (07/2018), History of gestational diabetes (06/15/2016), and History of screening mammography (08/2019).     Surgical History: has a past surgical history that includes Appendectomy (1995); Hysterectomy (1995); and Excision basal cell carcinoma (04/01/2015).     Family History: family history includes Kidney disease in an other family member; Lung cancer in her paternal grandfather.     Social History: reports that she has never smoked. She has never used smokeless tobacco. She reports that she does not drink alcohol and does not use drugs.    Allergies: Patient has no known allergies.          Current Outpatient Medications:   •  cetirizine (ZyrTEC Allergy) 10 MG tablet, Zyrtec 10 mg oral tablet take 1 tablet (10 mg) by oral route once daily   Active, Disp: , Rfl:     There are no discontinued medications.      Review of Systems   Constitutional: Negative for fever.   Respiratory: Negative for cough and shortness of breath.    Cardiovascular: Negative for chest pain, palpitations and leg swelling.   Neurological: Negative for dizziness, weakness, numbness and headache.        Objective         Vitals:    10/25/21 0704   BP: 138/77   BP Location: Left arm   Patient Position: Sitting   Cuff Size: Adult   Pulse: 67   Resp: 16   Temp: 97.6 °F (36.4 °C)   TempSrc: Infrared   SpO2: 96%   Weight: 55.9 kg (123 lb 3.2 oz)   Height: 160 cm (63\")     Body mass index is 21.82 kg/m².         Physical Exam  Vitals reviewed.   Constitutional:       Appearance: Normal appearance. She is well-developed.   HENT:      Head: Normocephalic and atraumatic.      " Mouth/Throat:      Pharynx: No oropharyngeal exudate.   Eyes:      Conjunctiva/sclera: Conjunctivae normal.      Pupils: Pupils are equal, round, and reactive to light.   Cardiovascular:      Rate and Rhythm: Normal rate and regular rhythm.      Heart sounds: No murmur heard.  No friction rub. No gallop.    Pulmonary:      Effort: Pulmonary effort is normal.      Breath sounds: Normal breath sounds. No wheezing or rhonchi.   Genitourinary:     General: Normal vulva.      Labia:         Right: No rash.         Left: No rash.       Vagina: Normal.      Uterus: Absent.       Comments: Cervix surgically absent  Skin:     General: Skin is warm and dry.   Neurological:      Mental Status: She is alert and oriented to person, place, and time.   Psychiatric:         Mood and Affect: Mood and affect normal.         Behavior: Behavior normal.         Thought Content: Thought content normal.         Judgment: Judgment normal.             Result Review :               Assessment and Plan     Diagnoses and all orders for this visit:    1. Postmenopausal syndrome (Primary)  -     DEXA Bone Density Axial    2. Encounter for gynecological examination (general) (routine) without abnormal findings  -     IGP, Rfx Aptima HPV ASCU    3. Screening for colon cancer  -     POCT occult blood x 1 stool              Follow Up     Return for Next scheduled follow up.    Patient was given instructions and counseling regarding her condition or for health maintenance advice. Please see specific information pulled into the AVS if appropriate.

## 2021-10-27 ENCOUNTER — TELEPHONE (OUTPATIENT)
Dept: FAMILY MEDICINE CLINIC | Facility: CLINIC | Age: 61
End: 2021-10-27

## 2021-10-27 LAB
CONV .: NORMAL
CYTOLOGIST CVX/VAG CYTO: NORMAL
CYTOLOGY CVX/VAG DOC CYTO: NORMAL
CYTOLOGY CVX/VAG DOC THIN PREP: NORMAL
DX ICD CODE: NORMAL
HIV 1 & 2 AB SER-IMP: NORMAL
OTHER STN SPEC: NORMAL
STAT OF ADQ CVX/VAG CYTO-IMP: NORMAL

## 2021-10-27 NOTE — TELEPHONE ENCOUNTER
----- Message from Kar Castro MD sent at 10/27/2021 10:37 AM EDT -----  Normal Pap smear, repeat after 3 years

## 2021-10-27 NOTE — TELEPHONE ENCOUNTER
----- Message from Kar Castro MD sent at 10/26/2021  5:27 PM EDT -----  Your Cologuard test was inconclusive so the company will contact you for another Cologuard

## 2021-11-20 ENCOUNTER — HOSPITAL ENCOUNTER (OUTPATIENT)
Dept: MAMMOGRAPHY | Facility: HOSPITAL | Age: 61
Discharge: HOME | End: 2021-11-20
Admitting: STUDENT IN AN ORGANIZED HEALTH CARE EDUCATION/TRAINING PROGRAM

## 2021-11-20 DIAGNOSIS — Z12.31 ENCOUNTER FOR SCREENING MAMMOGRAM FOR MALIGNANT NEOPLASM OF BREAST: ICD-10-CM

## 2021-11-20 PROCEDURE — 77067 SCR MAMMO BI INCL CAD: CPT

## 2021-11-20 PROCEDURE — 77063 BREAST TOMOSYNTHESIS BI: CPT

## 2021-12-17 ENCOUNTER — OFFICE VISIT (OUTPATIENT)
Dept: FAMILY MEDICINE CLINIC | Facility: CLINIC | Age: 61
End: 2021-12-17

## 2021-12-17 VITALS
WEIGHT: 123 LBS | HEART RATE: 90 BPM | SYSTOLIC BLOOD PRESSURE: 116 MMHG | DIASTOLIC BLOOD PRESSURE: 74 MMHG | HEIGHT: 63 IN | TEMPERATURE: 97.7 F | BODY MASS INDEX: 21.79 KG/M2 | RESPIRATION RATE: 18 BRPM | OXYGEN SATURATION: 97 %

## 2021-12-17 DIAGNOSIS — J06.9 UPPER RESPIRATORY TRACT INFECTION, UNSPECIFIED TYPE: ICD-10-CM

## 2021-12-17 DIAGNOSIS — J01.00 ACUTE NON-RECURRENT MAXILLARY SINUSITIS: Primary | ICD-10-CM

## 2021-12-17 PROCEDURE — 99213 OFFICE O/P EST LOW 20 MIN: CPT | Performed by: STUDENT IN AN ORGANIZED HEALTH CARE EDUCATION/TRAINING PROGRAM

## 2021-12-17 RX ORDER — PREDNISONE 20 MG/1
20 TABLET ORAL DAILY
Qty: 5 TABLET | Refills: 0 | Status: SHIPPED | OUTPATIENT
Start: 2021-12-17 | End: 2022-09-27

## 2021-12-17 RX ORDER — AZITHROMYCIN 250 MG/1
TABLET, FILM COATED ORAL
Qty: 6 TABLET | Refills: 0 | Status: SHIPPED | OUTPATIENT
Start: 2021-12-17 | End: 2022-09-27

## 2021-12-17 NOTE — PROGRESS NOTES
"Chief Complaint  Complaining of 4 to 5 days history of sinus congestion and respiratory congestion/drainage    Subjective         Kasey Soler is a 61 y.o. female who presents to [facility]  61 years old female comes to the clinic today for an acute visit.    Patient is complaining of few days history of worsening nasal congestion/sinus pressure and respiratory congestion without any chest pain/shortness of breath.  Denies any fever/anosmia/recent sick contact or any recent travel.  Patient denies any ear pain, does report nighttime cough.    Review of Systems   Objective   Vital Signs:   Vitals:    12/17/21 0747   BP: 116/74   Pulse: 90   Resp: 18   Temp: 97.7 °F (36.5 °C)   SpO2: 97%   Weight: 55.8 kg (123 lb)   Height: 160 cm (63\")      Body mass index is 21.79 kg/m².   Physical Exam  HENT:      Right Ear: Tympanic membrane is bulging.      Left Ear: Tympanic membrane is bulging.      Mouth/Throat:      Pharynx: Pharyngeal swelling present. No oropharyngeal exudate, posterior oropharyngeal erythema or uvula swelling.      Comments: Positive sinus tenderness  Pulmonary:      Effort: Pulmonary effort is normal.      Breath sounds: Normal breath sounds.                 Assessment and Plan   Diagnoses and all orders for this visit:    1. Acute non-recurrent maxillary sinusitis (Primary)  -     azithromycin (Zithromax Z-Agustín) 250 MG tablet; Take 2 tablets by mouth on day 1, then 1 tablet daily on days 2-5  Dispense: 6 tablet; Refill: 0  -     predniSONE (DELTASONE) 20 MG tablet; Take 1 tablet by mouth Daily.  Dispense: 5 tablet; Refill: 0    2. Upper respiratory tract infection, unspecified type  -     azithromycin (Zithromax Z-Agustín) 250 MG tablet; Take 2 tablets by mouth on day 1, then 1 tablet daily on days 2-5  Dispense: 6 tablet; Refill: 0  -     predniSONE (DELTASONE) 20 MG tablet; Take 1 tablet by mouth Daily.  Dispense: 5 tablet; Refill: 0      Patient declined COVID-19 " swab    We will empirically treat patient with azithromycin and prednisone; patient understands and agrees with the plan.  Patient to call if any changes with symptoms.  COVID-19 precautions discussed.    Follow Up   Return if symptoms worsen or fail to improve.  Patient was given instructions and counseling regarding her condition or for health maintenance advice. Please see specific information pulled into the AVS if appropriate.

## 2022-02-10 ENCOUNTER — APPOINTMENT (OUTPATIENT)
Dept: BONE DENSITY | Facility: HOSPITAL | Age: 62
End: 2022-02-10

## 2022-09-21 ENCOUNTER — TELEPHONE (OUTPATIENT)
Dept: FAMILY MEDICINE CLINIC | Facility: CLINIC | Age: 62
End: 2022-09-21

## 2022-09-21 NOTE — TELEPHONE ENCOUNTER
Patient is wanting to know if she needs labs done before her 9/27/22 appointment.    Patients mammogram is due 11/2023

## 2022-09-22 DIAGNOSIS — Z00.00 ANNUAL PHYSICAL EXAM: Primary | ICD-10-CM

## 2022-09-23 ENCOUNTER — LAB (OUTPATIENT)
Dept: LAB | Facility: HOSPITAL | Age: 62
End: 2022-09-23

## 2022-09-23 DIAGNOSIS — Z00.00 ANNUAL PHYSICAL EXAM: ICD-10-CM

## 2022-09-23 LAB
ALBUMIN SERPL-MCNC: 4.7 G/DL (ref 3.5–5.2)
ALBUMIN/GLOB SERPL: 2 G/DL
ALP SERPL-CCNC: 72 U/L (ref 39–117)
ALT SERPL W P-5'-P-CCNC: 17 U/L (ref 1–33)
ANION GAP SERPL CALCULATED.3IONS-SCNC: 9.5 MMOL/L (ref 5–15)
AST SERPL-CCNC: 22 U/L (ref 1–32)
BASOPHILS # BLD AUTO: 0.03 10*3/MM3 (ref 0–0.2)
BASOPHILS NFR BLD AUTO: 0.6 % (ref 0–1.5)
BILIRUB SERPL-MCNC: 1 MG/DL (ref 0–1.2)
BUN SERPL-MCNC: 7 MG/DL (ref 8–23)
BUN/CREAT SERPL: 13.5 (ref 7–25)
CALCIUM SPEC-SCNC: 9.8 MG/DL (ref 8.6–10.5)
CHLORIDE SERPL-SCNC: 102 MMOL/L (ref 98–107)
CHOLEST SERPL-MCNC: 218 MG/DL (ref 0–200)
CO2 SERPL-SCNC: 26.5 MMOL/L (ref 22–29)
CREAT SERPL-MCNC: 0.52 MG/DL (ref 0.57–1)
DEPRECATED RDW RBC AUTO: 39.4 FL (ref 37–54)
EGFRCR SERPLBLD CKD-EPI 2021: 103.9 ML/MIN/1.73
EOSINOPHIL # BLD AUTO: 0.13 10*3/MM3 (ref 0–0.4)
EOSINOPHIL NFR BLD AUTO: 2.7 % (ref 0.3–6.2)
ERYTHROCYTE [DISTWIDTH] IN BLOOD BY AUTOMATED COUNT: 12.6 % (ref 12.3–15.4)
FOLATE SERPL-MCNC: >20 NG/ML (ref 4.78–24.2)
GLOBULIN UR ELPH-MCNC: 2.3 GM/DL
GLUCOSE SERPL-MCNC: 112 MG/DL (ref 65–99)
HBA1C MFR BLD: 5.7 % (ref 4.8–5.6)
HCT VFR BLD AUTO: 37.7 % (ref 34–46.6)
HDLC SERPL-MCNC: 85 MG/DL (ref 40–60)
HGB BLD-MCNC: 13.2 G/DL (ref 12–15.9)
IMM GRANULOCYTES # BLD AUTO: 0.01 10*3/MM3 (ref 0–0.05)
IMM GRANULOCYTES NFR BLD AUTO: 0.2 % (ref 0–0.5)
LDLC SERPL CALC-MCNC: 117 MG/DL (ref 0–100)
LDLC/HDLC SERPL: 1.35 {RATIO}
LYMPHOCYTES # BLD AUTO: 1.47 10*3/MM3 (ref 0.7–3.1)
LYMPHOCYTES NFR BLD AUTO: 30.8 % (ref 19.6–45.3)
MCH RBC QN AUTO: 30.3 PG (ref 26.6–33)
MCHC RBC AUTO-ENTMCNC: 35 G/DL (ref 31.5–35.7)
MCV RBC AUTO: 86.7 FL (ref 79–97)
MONOCYTES # BLD AUTO: 0.32 10*3/MM3 (ref 0.1–0.9)
MONOCYTES NFR BLD AUTO: 6.7 % (ref 5–12)
NEUTROPHILS NFR BLD AUTO: 2.81 10*3/MM3 (ref 1.7–7)
NEUTROPHILS NFR BLD AUTO: 59 % (ref 42.7–76)
NRBC BLD AUTO-RTO: 0 /100 WBC (ref 0–0.2)
PLATELET # BLD AUTO: 291 10*3/MM3 (ref 140–450)
PMV BLD AUTO: 9.2 FL (ref 6–12)
POTASSIUM SERPL-SCNC: 4.3 MMOL/L (ref 3.5–5.2)
PROT SERPL-MCNC: 7 G/DL (ref 6–8.5)
RBC # BLD AUTO: 4.35 10*6/MM3 (ref 3.77–5.28)
SODIUM SERPL-SCNC: 138 MMOL/L (ref 136–145)
TRIGL SERPL-MCNC: 93 MG/DL (ref 0–150)
TSH SERPL DL<=0.05 MIU/L-ACNC: 1.39 UIU/ML (ref 0.27–4.2)
VIT B12 BLD-MCNC: 291 PG/ML (ref 211–946)
VLDLC SERPL-MCNC: 16 MG/DL (ref 5–40)
WBC NRBC COR # BLD: 4.77 10*3/MM3 (ref 3.4–10.8)

## 2022-09-23 PROCEDURE — 82746 ASSAY OF FOLIC ACID SERUM: CPT

## 2022-09-23 PROCEDURE — 82607 VITAMIN B-12: CPT

## 2022-09-23 PROCEDURE — 36415 COLL VENOUS BLD VENIPUNCTURE: CPT

## 2022-09-23 PROCEDURE — 83036 HEMOGLOBIN GLYCOSYLATED A1C: CPT

## 2022-09-23 PROCEDURE — 80061 LIPID PANEL: CPT

## 2022-09-23 PROCEDURE — 80050 GENERAL HEALTH PANEL: CPT

## 2022-09-27 ENCOUNTER — OFFICE VISIT (OUTPATIENT)
Dept: FAMILY MEDICINE CLINIC | Facility: CLINIC | Age: 62
End: 2022-09-27

## 2022-09-27 VITALS
BODY MASS INDEX: 21.42 KG/M2 | WEIGHT: 120.9 LBS | TEMPERATURE: 97.8 F | RESPIRATION RATE: 19 BRPM | OXYGEN SATURATION: 97 % | HEART RATE: 79 BPM | HEIGHT: 63 IN | SYSTOLIC BLOOD PRESSURE: 120 MMHG | DIASTOLIC BLOOD PRESSURE: 82 MMHG

## 2022-09-27 DIAGNOSIS — Z23 NEED FOR TDAP VACCINATION: ICD-10-CM

## 2022-09-27 DIAGNOSIS — Z12.31 ENCOUNTER FOR SCREENING MAMMOGRAM FOR MALIGNANT NEOPLASM OF BREAST: ICD-10-CM

## 2022-09-27 DIAGNOSIS — Z23 FLU VACCINE NEED: ICD-10-CM

## 2022-09-27 DIAGNOSIS — Z12.11 COLON CANCER SCREENING: ICD-10-CM

## 2022-09-27 DIAGNOSIS — Z00.00 ANNUAL PHYSICAL EXAM: Primary | ICD-10-CM

## 2022-09-27 PROCEDURE — 90472 IMMUNIZATION ADMIN EACH ADD: CPT | Performed by: STUDENT IN AN ORGANIZED HEALTH CARE EDUCATION/TRAINING PROGRAM

## 2022-09-27 PROCEDURE — 90686 IIV4 VACC NO PRSV 0.5 ML IM: CPT | Performed by: STUDENT IN AN ORGANIZED HEALTH CARE EDUCATION/TRAINING PROGRAM

## 2022-09-27 PROCEDURE — 99396 PREV VISIT EST AGE 40-64: CPT | Performed by: STUDENT IN AN ORGANIZED HEALTH CARE EDUCATION/TRAINING PROGRAM

## 2022-09-27 PROCEDURE — 90715 TDAP VACCINE 7 YRS/> IM: CPT | Performed by: STUDENT IN AN ORGANIZED HEALTH CARE EDUCATION/TRAINING PROGRAM

## 2022-09-27 PROCEDURE — 90471 IMMUNIZATION ADMIN: CPT | Performed by: STUDENT IN AN ORGANIZED HEALTH CARE EDUCATION/TRAINING PROGRAM

## 2022-09-27 NOTE — PROGRESS NOTES
"Chief Complaint  Patient is here for annual physical    Subjective         Kasey Soler is a 62 y.o. female who presents to [facility]    62 years old female comes to the clinic today to follow-up on blood work and annual physical.    Recent blood work reviewed with patient; significant for hyperlipidemia and low B12.    Patient reports no acute concern, reports upon urgent care visit due to thoracic back pain which has improved significantly, currently asymptomatic.    Agrees to get tetanus and flu shot.    Declined colonoscopy/Cologuard at this time.    Agrees to get mammogram, last mammogram was done in November 2021.    Physically very active without any chest pain or shortness of breath.        Objective   Vital Signs:   Vitals:    09/27/22 0717   BP: 120/82   Pulse: 79   Resp: 19   Temp: 97.8 °F (36.6 °C)   SpO2: 97%   Weight: 54.8 kg (120 lb 14.4 oz)   Height: 160 cm (63\")      Body mass index is 21.42 kg/m².   Wt Readings from Last 3 Encounters:   09/27/22 54.8 kg (120 lb 14.4 oz)   12/17/21 55.8 kg (123 lb)   10/25/21 55.9 kg (123 lb 3.2 oz)      BP Readings from Last 3 Encounters:   09/27/22 120/82   12/17/21 116/74   10/25/21 138/77        Patient Care Team:  Kar Castro MD as PCP - General (Family Medicine)     Physical Exam  Vitals reviewed.   Constitutional:       Appearance: Normal appearance. She is well-developed.   HENT:      Head: Normocephalic and atraumatic.      Right Ear: External ear normal.      Left Ear: External ear normal.      Mouth/Throat:      Pharynx: No oropharyngeal exudate.   Eyes:      Conjunctiva/sclera: Conjunctivae normal.      Pupils: Pupils are equal, round, and reactive to light.   Cardiovascular:      Rate and Rhythm: Normal rate and regular rhythm.      Heart sounds: No murmur heard.    No friction rub. No gallop.   Pulmonary:      Effort: Pulmonary effort is normal.      Breath sounds: Normal breath sounds. No wheezing or rhonchi. "   Abdominal:      General: Bowel sounds are normal. There is no distension.      Palpations: Abdomen is soft.      Tenderness: There is no abdominal tenderness.   Skin:     General: Skin is warm and dry.   Neurological:      Mental Status: She is alert and oriented to person, place, and time.      Cranial Nerves: No cranial nerve deficit.   Psychiatric:         Mood and Affect: Mood and affect normal.         Behavior: Behavior normal.         Thought Content: Thought content normal.         Judgment: Judgment normal.                       Assessment and Plan   Diagnoses and all orders for this visit:    1. Annual physical (Primary)  Comments:  Daily exercise and healthy diet recommended, last blood work reviewed.  Patient to take B12 supplements    2. Need for Tdap vaccination  -     Tdap Vaccine Greater Than or Equal To 6yo IM    3. Encounter for screening mammogram for malignant neoplasm of breast  -     Mammo Screening Digital Tomosynthesis Bilateral With CAD; Future    4. Colon cancer screening  Comments:  declined    5. Flu vaccine need  -     FluLaval/Fluarix/Fluzone >6 Months          Tobacco Use: Low Risk    • Smoking Tobacco Use: Never Smoker   • Smokeless Tobacco Use: Never Used            Follow Up   Return in about 6 months (around 3/27/2023) for Recheck, Next scheduled follow up.  Patient was given instructions and counseling regarding her condition or for health maintenance advice. Please see specific information pulled into the AVS if appropriate.

## 2022-12-01 ENCOUNTER — HOSPITAL ENCOUNTER (OUTPATIENT)
Dept: MAMMOGRAPHY | Facility: HOSPITAL | Age: 62
Discharge: HOME | End: 2022-12-01
Admitting: STUDENT IN AN ORGANIZED HEALTH CARE EDUCATION/TRAINING PROGRAM

## 2022-12-01 DIAGNOSIS — Z12.31 ENCOUNTER FOR SCREENING MAMMOGRAM FOR MALIGNANT NEOPLASM OF BREAST: ICD-10-CM

## 2022-12-01 PROCEDURE — 77067 SCR MAMMO BI INCL CAD: CPT

## 2022-12-01 PROCEDURE — 77063 BREAST TOMOSYNTHESIS BI: CPT

## 2023-03-29 ENCOUNTER — OFFICE VISIT (OUTPATIENT)
Dept: FAMILY MEDICINE CLINIC | Facility: CLINIC | Age: 63
End: 2023-03-29
Payer: COMMERCIAL

## 2023-03-29 ENCOUNTER — TELEPHONE (OUTPATIENT)
Dept: FAMILY MEDICINE CLINIC | Facility: CLINIC | Age: 63
End: 2023-03-29

## 2023-03-29 VITALS
RESPIRATION RATE: 16 BRPM | BODY MASS INDEX: 21.88 KG/M2 | OXYGEN SATURATION: 98 % | DIASTOLIC BLOOD PRESSURE: 78 MMHG | TEMPERATURE: 98.2 F | HEIGHT: 63 IN | SYSTOLIC BLOOD PRESSURE: 128 MMHG | WEIGHT: 123.5 LBS | HEART RATE: 78 BPM

## 2023-03-29 DIAGNOSIS — J34.89 SINUS DRAINAGE: ICD-10-CM

## 2023-03-29 DIAGNOSIS — J30.2 SEASONAL ALLERGIES: Primary | ICD-10-CM

## 2023-03-29 PROCEDURE — 99213 OFFICE O/P EST LOW 20 MIN: CPT | Performed by: STUDENT IN AN ORGANIZED HEALTH CARE EDUCATION/TRAINING PROGRAM

## 2023-03-29 RX ORDER — AZITHROMYCIN 250 MG/1
TABLET, FILM COATED ORAL
Qty: 6 TABLET | Refills: 0 | Status: SHIPPED | OUTPATIENT
Start: 2023-03-29

## 2023-03-29 RX ORDER — PREDNISONE 20 MG/1
20 TABLET ORAL DAILY
Qty: 5 TABLET | Refills: 0 | Status: SHIPPED | OUTPATIENT
Start: 2023-03-29

## 2023-03-29 RX ORDER — FLUTICASONE PROPIONATE 50 MCG
2 SPRAY, SUSPENSION (ML) NASAL DAILY
Qty: 16 G | Refills: 1 | Status: SHIPPED | OUTPATIENT
Start: 2023-03-29

## 2023-03-29 NOTE — PROGRESS NOTES
"Chief Complaint  Following up on seasonal allergies and complaining of sinus drainage    Subjective         Kasey Soler is a 62 y.o. female who presents to [facility]    62 years old comes to the clinic today to follow-up.    Complaining of mildly worsening of seasonal allergies, takes Zyrtec on and off.  Patient does report spending some time outside in the last few days and complaining of some sinus drainage/congestion.  Denies any ear pain/sore throat or any fever.  Denies any sick contact or recent travel.    Physically very healthy without any chest pain or shortness of breath.    Objective   Vital Signs:   Vitals:    03/29/23 0659   BP: 128/78   BP Location: Left arm   Patient Position: Sitting   Cuff Size: Adult   Pulse: 78   Resp: 16   Temp: 98.2 °F (36.8 °C)   TempSrc: Temporal   SpO2: 98%   Weight: 56 kg (123 lb 8 oz)   Height: 160 cm (63\")      Body mass index is 21.88 kg/m².   Wt Readings from Last 3 Encounters:   03/29/23 56 kg (123 lb 8 oz)   09/27/22 54.8 kg (120 lb 14.4 oz)   12/17/21 55.8 kg (123 lb)      BP Readings from Last 3 Encounters:   03/29/23 128/78   09/27/22 120/82   12/17/21 116/74        Patient Care Team:  Kar Castro MD as PCP - General (Family Medicine)     Physical Exam  Vitals reviewed.   Constitutional:       Appearance: Normal appearance. She is well-developed.   HENT:      Head: Normocephalic and atraumatic.      Right Ear: External ear normal. Tympanic membrane has decreased mobility.      Left Ear: External ear normal. Tympanic membrane has decreased mobility.      Mouth/Throat:      Pharynx: No oropharyngeal exudate.      Comments: Sinus drainage noted  Eyes:      Conjunctiva/sclera: Conjunctivae normal.      Pupils: Pupils are equal, round, and reactive to light.   Cardiovascular:      Rate and Rhythm: Normal rate and regular rhythm.      Heart sounds: No murmur heard.    No friction rub. No gallop.   Pulmonary:      Effort: " Pulmonary effort is normal.      Breath sounds: Normal breath sounds. No wheezing or rhonchi.   Abdominal:      General: Bowel sounds are normal. There is no distension.      Palpations: Abdomen is soft.      Tenderness: There is no abdominal tenderness.   Skin:     General: Skin is warm and dry.   Neurological:      Mental Status: She is alert and oriented to person, place, and time.      Cranial Nerves: No cranial nerve deficit.   Psychiatric:         Mood and Affect: Mood and affect normal.         Behavior: Behavior normal.         Thought Content: Thought content normal.         Judgment: Judgment normal.                       Assessment and Plan   Diagnoses and all orders for this visit:    1. Seasonal allergies (Primary)  -     azithromycin (Zithromax Z-Agustín) 250 MG tablet; Take 2 tablets by mouth on day 1, then 1 tablet daily on days 2-5  Dispense: 6 tablet; Refill: 0  -     predniSONE (DELTASONE) 20 MG tablet; Take 1 tablet by mouth Daily.  Dispense: 5 tablet; Refill: 0  -     fluticasone (FLONASE) 50 MCG/ACT nasal spray; 2 sprays into the nostril(s) as directed by provider Daily.  Dispense: 16 g; Refill: 1    2. Sinus drainage  -     azithromycin (Zithromax Z-Agustín) 250 MG tablet; Take 2 tablets by mouth on day 1, then 1 tablet daily on days 2-5  Dispense: 6 tablet; Refill: 0  -     predniSONE (DELTASONE) 20 MG tablet; Take 1 tablet by mouth Daily.  Dispense: 5 tablet; Refill: 0  -     fluticasone (FLONASE) 50 MCG/ACT nasal spray; 2 sprays into the nostril(s) as directed by provider Daily.  Dispense: 16 g; Refill: 1      Patient to call if any changes or no improvement    Tobacco Use: Low Risk    • Smoking Tobacco Use: Never   • Smokeless Tobacco Use: Never   • Passive Exposure: Not on file            Follow Up   Return in about 6 months (around 9/29/2023) for Annual physical.  Patient was given instructions and counseling regarding her condition or for health maintenance advice. Please see specific  information pulled into the AVS if appropriate.

## 2023-03-29 NOTE — TELEPHONE ENCOUNTER
PT WANTING AN ORDER FOR HER MAMMOGRAM WHICH WILL BE DUE ON HER NEXT VISIT. PT WANTING US TO GO AHEAD AND GET MAMMOGRAM SCHEDULED.

## 2023-04-04 NOTE — TELEPHONE ENCOUNTER
SPOKE WITH THE PATIENT LETTING HER KNOW THAT DR RAMÍREZ IS WANTING TO WAIT UNTIL PATIENTS NEXT VISIT BEFORE HE ORDERS HER MAMMOGRAM. PATIENT OKAY WITH THIS, BUT IS CONCERNED THAT SHE WILL HAVE TO WAIT UNTIL 2024 BEFORE SHE CAN GET IN FOR HER MAMMOGRAM. PT'S MAMMOGRAM IS DUE IN December 2023.

## 2023-09-07 ENCOUNTER — TELEPHONE (OUTPATIENT)
Dept: FAMILY MEDICINE CLINIC | Facility: CLINIC | Age: 63
End: 2023-09-07
Payer: COMMERCIAL

## 2023-09-07 NOTE — TELEPHONE ENCOUNTER
Patient was scheduled for 9/29/23 and needed to be rescheduled due to provider being out of office. Patient rescheduled for 11/1/2023.   Patient is requesting to know if blood work and yearly mammogram can be ordered. Patient is due to mammogram in December and would like to go ahead and get appointment set up.

## 2023-09-08 NOTE — TELEPHONE ENCOUNTER
Patient is aware that Matthew is out of the She's going to call and see if she can get scheduled for her mammogram with out the order being in at this time

## 2023-09-18 ENCOUNTER — TRANSCRIBE ORDERS (OUTPATIENT)
Dept: ADMINISTRATIVE | Facility: HOSPITAL | Age: 63
End: 2023-09-18
Payer: COMMERCIAL

## 2023-09-18 DIAGNOSIS — Z12.31 VISIT FOR SCREENING MAMMOGRAM: Primary | ICD-10-CM

## 2023-11-01 ENCOUNTER — OFFICE VISIT (OUTPATIENT)
Dept: FAMILY MEDICINE CLINIC | Facility: CLINIC | Age: 63
End: 2023-11-01
Payer: MEDICARE

## 2023-11-01 ENCOUNTER — LAB (OUTPATIENT)
Dept: LAB | Facility: HOSPITAL | Age: 63
End: 2023-11-01
Payer: MEDICARE

## 2023-11-01 VITALS
HEART RATE: 95 BPM | OXYGEN SATURATION: 98 % | WEIGHT: 121.6 LBS | RESPIRATION RATE: 16 BRPM | BODY MASS INDEX: 21.55 KG/M2 | DIASTOLIC BLOOD PRESSURE: 62 MMHG | SYSTOLIC BLOOD PRESSURE: 104 MMHG | HEIGHT: 63 IN | TEMPERATURE: 98 F

## 2023-11-01 DIAGNOSIS — Z11.59 NEED FOR HEPATITIS C SCREENING TEST: ICD-10-CM

## 2023-11-01 DIAGNOSIS — Z78.0 POSTMENOPAUSE: ICD-10-CM

## 2023-11-01 DIAGNOSIS — Z23 NEED FOR INFLUENZA VACCINATION: ICD-10-CM

## 2023-11-01 DIAGNOSIS — Z12.31 ENCOUNTER FOR SCREENING MAMMOGRAM FOR MALIGNANT NEOPLASM OF BREAST: ICD-10-CM

## 2023-11-01 DIAGNOSIS — R09.81 SINUS CONGESTION: ICD-10-CM

## 2023-11-01 DIAGNOSIS — Z00.00 MEDICARE ANNUAL WELLNESS VISIT, INITIAL: ICD-10-CM

## 2023-11-01 DIAGNOSIS — Z00.00 MEDICARE ANNUAL WELLNESS VISIT, INITIAL: Primary | ICD-10-CM

## 2023-11-01 DIAGNOSIS — Z23 NEED FOR IMMUNIZATION AGAINST INFLUENZA: ICD-10-CM

## 2023-11-01 DIAGNOSIS — Z23 NEED FOR PNEUMOCOCCAL VACCINATION: ICD-10-CM

## 2023-11-01 DIAGNOSIS — R73.01 IMPAIRED FASTING BLOOD SUGAR: ICD-10-CM

## 2023-11-01 LAB
ALBUMIN SERPL-MCNC: 5 G/DL (ref 3.5–5.2)
ALBUMIN/GLOB SERPL: 2.2 G/DL
ALP SERPL-CCNC: 82 U/L (ref 39–117)
ALT SERPL W P-5'-P-CCNC: 18 U/L (ref 1–33)
ANION GAP SERPL CALCULATED.3IONS-SCNC: 10.2 MMOL/L (ref 5–15)
AST SERPL-CCNC: 27 U/L (ref 1–32)
BACTERIA UR QL AUTO: NORMAL /HPF
BASOPHILS # BLD AUTO: 0.03 10*3/MM3 (ref 0–0.2)
BASOPHILS NFR BLD AUTO: 0.8 % (ref 0–1.5)
BILIRUB SERPL-MCNC: 1.1 MG/DL (ref 0–1.2)
BILIRUB UR QL STRIP: NEGATIVE
BUN SERPL-MCNC: 10 MG/DL (ref 8–23)
BUN/CREAT SERPL: 18.5 (ref 7–25)
CALCIUM SPEC-SCNC: 10.1 MG/DL (ref 8.6–10.5)
CHLORIDE SERPL-SCNC: 100 MMOL/L (ref 98–107)
CHOLEST SERPL-MCNC: 233 MG/DL (ref 0–200)
CLARITY UR: CLEAR
CO2 SERPL-SCNC: 26.8 MMOL/L (ref 22–29)
COLOR UR: YELLOW
CREAT SERPL-MCNC: 0.54 MG/DL (ref 0.57–1)
DEPRECATED RDW RBC AUTO: 39.5 FL (ref 37–54)
EGFRCR SERPLBLD CKD-EPI 2021: 102.3 ML/MIN/1.73
EOSINOPHIL # BLD AUTO: 0.08 10*3/MM3 (ref 0–0.4)
EOSINOPHIL NFR BLD AUTO: 2 % (ref 0.3–6.2)
ERYTHROCYTE [DISTWIDTH] IN BLOOD BY AUTOMATED COUNT: 12.2 % (ref 12.3–15.4)
GLOBULIN UR ELPH-MCNC: 2.3 GM/DL
GLUCOSE SERPL-MCNC: 110 MG/DL (ref 65–99)
GLUCOSE UR STRIP-MCNC: NEGATIVE MG/DL
HBA1C MFR BLD: 5.7 % (ref 4.8–5.6)
HCT VFR BLD AUTO: 40.1 % (ref 34–46.6)
HCV AB SER DONR QL: NORMAL
HDLC SERPL-MCNC: 86 MG/DL (ref 40–60)
HGB BLD-MCNC: 13.8 G/DL (ref 12–15.9)
HGB UR QL STRIP.AUTO: NEGATIVE
HYALINE CASTS UR QL AUTO: NORMAL /LPF
IMM GRANULOCYTES # BLD AUTO: 0.01 10*3/MM3 (ref 0–0.05)
IMM GRANULOCYTES NFR BLD AUTO: 0.3 % (ref 0–0.5)
KETONES UR QL STRIP: NEGATIVE
LDLC SERPL CALC-MCNC: 136 MG/DL (ref 0–100)
LDLC/HDLC SERPL: 1.56 {RATIO}
LEUKOCYTE ESTERASE UR QL STRIP.AUTO: ABNORMAL
LYMPHOCYTES # BLD AUTO: 0.92 10*3/MM3 (ref 0.7–3.1)
LYMPHOCYTES NFR BLD AUTO: 23.2 % (ref 19.6–45.3)
MCH RBC QN AUTO: 30.4 PG (ref 26.6–33)
MCHC RBC AUTO-ENTMCNC: 34.4 G/DL (ref 31.5–35.7)
MCV RBC AUTO: 88.3 FL (ref 79–97)
MONOCYTES # BLD AUTO: 0.35 10*3/MM3 (ref 0.1–0.9)
MONOCYTES NFR BLD AUTO: 8.8 % (ref 5–12)
NEUTROPHILS NFR BLD AUTO: 2.58 10*3/MM3 (ref 1.7–7)
NEUTROPHILS NFR BLD AUTO: 64.9 % (ref 42.7–76)
NITRITE UR QL STRIP: NEGATIVE
NRBC BLD AUTO-RTO: 0.3 /100 WBC (ref 0–0.2)
PH UR STRIP.AUTO: 6 [PH] (ref 5–8)
PLATELET # BLD AUTO: 295 10*3/MM3 (ref 140–450)
PMV BLD AUTO: 9.4 FL (ref 6–12)
POTASSIUM SERPL-SCNC: 4.1 MMOL/L (ref 3.5–5.2)
PROT SERPL-MCNC: 7.3 G/DL (ref 6–8.5)
PROT UR QL STRIP: NEGATIVE
RBC # BLD AUTO: 4.54 10*6/MM3 (ref 3.77–5.28)
RBC # UR STRIP: NORMAL /HPF
REF LAB TEST METHOD: NORMAL
SODIUM SERPL-SCNC: 137 MMOL/L (ref 136–145)
SP GR UR STRIP: 1.01 (ref 1–1.03)
SQUAMOUS #/AREA URNS HPF: NORMAL /HPF
TRIGL SERPL-MCNC: 66 MG/DL (ref 0–150)
TSH SERPL DL<=0.05 MIU/L-ACNC: 1.43 UIU/ML (ref 0.27–4.2)
UROBILINOGEN UR QL STRIP: ABNORMAL
VLDLC SERPL-MCNC: 11 MG/DL (ref 5–40)
WBC # UR STRIP: NORMAL /HPF
WBC NRBC COR # BLD: 3.97 10*3/MM3 (ref 3.4–10.8)

## 2023-11-01 PROCEDURE — 83036 HEMOGLOBIN GLYCOSYLATED A1C: CPT

## 2023-11-01 PROCEDURE — 80053 COMPREHEN METABOLIC PANEL: CPT

## 2023-11-01 PROCEDURE — 80061 LIPID PANEL: CPT

## 2023-11-01 PROCEDURE — 81001 URINALYSIS AUTO W/SCOPE: CPT

## 2023-11-01 PROCEDURE — 85025 COMPLETE CBC W/AUTO DIFF WBC: CPT

## 2023-11-01 PROCEDURE — 86803 HEPATITIS C AB TEST: CPT

## 2023-11-01 PROCEDURE — 36415 COLL VENOUS BLD VENIPUNCTURE: CPT

## 2023-11-01 PROCEDURE — 84443 ASSAY THYROID STIM HORMONE: CPT

## 2023-11-01 RX ORDER — AZITHROMYCIN 250 MG/1
TABLET, FILM COATED ORAL
Qty: 6 TABLET | Refills: 0 | Status: SHIPPED | OUTPATIENT
Start: 2023-11-01

## 2023-11-01 RX ORDER — PREDNISONE 20 MG/1
20 TABLET ORAL DAILY
Qty: 5 TABLET | Refills: 0 | Status: SHIPPED | OUTPATIENT
Start: 2023-11-01

## 2023-11-01 NOTE — PROGRESS NOTES
The ABCs of the Annual Wellness Visit  [place] to Medicare Visit    Subjective     Kasey Soler is a 63 y.o. female who presents for a  Welcome to Medicare Visit.    The following portions of the patient's history were reviewed and   updated as appropriate: allergies, current medications, past family history, past medical history, past social history, past surgical history, and problem list.     Compared to one year ago, the patient feels her physical   health is the same.    Compared to one year ago, the patient feels her mental   health is the same.    Recent Hospitalizations:  She was not admitted to the hospital during the last year.       Current Medical Providers:  Patient Care Team:  Kar Castro MD as PCP - General (Family Medicine)    Outpatient Medications Prior to Visit   Medication Sig Dispense Refill    cetirizine (zyrTEC) 10 MG tablet Zyrtec 10 mg oral tablet take 1 tablet (10 mg) by oral route once daily   Active      fluticasone (FLONASE) 50 MCG/ACT nasal spray 2 sprays into the nostril(s) as directed by provider Daily. 16 g 1    azithromycin (Zithromax Z-Agustín) 250 MG tablet Take 2 tablets by mouth on day 1, then 1 tablet daily on days 2-5 (Patient not taking: Reported on 11/1/2023) 6 tablet 0    predniSONE (DELTASONE) 20 MG tablet Take 1 tablet by mouth Daily. (Patient not taking: Reported on 11/1/2023) 5 tablet 0     No facility-administered medications prior to visit.       No opioid medication identified on active medication list. I have reviewed chart for other potential  high risk medication/s and harmful drug interactions in the elderly.        Aspirin is not on active medication list.  Aspirin use is not indicated based on review of current medical condition/s. Risk of harm outweighs potential benefits.  .    Patient Active Problem List   Diagnosis    Seasonal allergies     Advance Care Planning   Advance Care Planning     Advance Directive is not on file.  ACP discussion was held with  "the patient during this visit. Patient does not have an advance directive, information provided.       Objective   Vitals:    23 0704   BP: 104/62   Pulse: 95   Resp: 16   Temp: 98 °F (36.7 °C)   SpO2: 98%   Weight: 55.2 kg (121 lb 9.6 oz)   Height: 160 cm (63\")   PainSc: 0-No pain     Estimated body mass index is 21.54 kg/m² as calculated from the following:    Height as of this encounter: 160 cm (63\").    Weight as of this encounter: 55.2 kg (121 lb 9.6 oz).    BMI is within normal parameters. No other follow-up for BMI required.      Does the patient have evidence of cognitive impairment?   No         Procedures       HEALTH RISK ASSESSMENT    Smoking Status:  Social History     Tobacco Use   Smoking Status Never   Smokeless Tobacco Never     Alcohol Consumption:  Social History     Substance and Sexual Activity   Alcohol Use Never       Fall Risk Screen:    CYDNEY Fall Risk Assessment was completed, and patient is at LOW risk for falls.Assessment completed on:2023    Depression Screen:       2023     7:13 AM   PHQ-2/PHQ-9 Depression Screening   Little Interest or Pleasure in Doing Things 0-->not at all   Feeling Down, Depressed or Hopeless 0-->not at all   PHQ-9: Brief Depression Severity Measure Score 0       Health Habits and Functional and Cognitive Screenin/1/2023     7:16 AM   Functional & Cognitive Status   Do you have difficulty preparing food and eating? No   Do you have difficulty bathing yourself, getting dressed or grooming yourself? No   Do you have difficulty using the toilet? No   Do you have difficulty moving around from place to place? No   Do you have trouble with steps or getting out of a bed or a chair? No   Current Diet Well Balanced Diet   Dental Exam Up to date   Eye Exam Up to date   Exercise (times per week) 7 times per week   Current Exercises Include Walking   Do you need help using the phone?  No   Are you deaf or do you have serious difficulty hearing?  No "   Do you need help to go to places out of walking distance? No   Do you need help shopping? No   Do you need help preparing meals?  No   Do you need help with housework?  No   Do you need help with laundry? No   Do you need help taking your medications? No   Do you need help managing money? No   Do you ever drive or ride in a car without wearing a seat belt? No   Have you felt unusual stress, anger or loneliness in the last month? No   Who do you live with? Spouse   If you need help, do you have trouble finding someone available to you? No   Have you been bothered in the last four weeks by sexual problems? No   Do you have difficulty concentrating, remembering or making decisions? No       Visual Acuity:    Vision Screening    Right eye Left eye Both eyes   Without correction 20/20 20/100 20/25   With correction          Age-appropriate Screening Schedule:  Refer to the list below for future screening recommendations based on patient's age, sex and/or medical conditions. Orders for these recommended tests are listed in the plan section. The patient has been provided with a written plan.    Health Maintenance   Topic Date Due    DXA SCAN  Never done    HEPATITIS C SCREENING  Never done    INFLUENZA VACCINE  11/01/2023 (Originally 8/1/2023)    Pneumococcal Vaccine 65+ (2 - PCV) 11/01/2023 (Originally 7/13/2023)    COVID-19 Vaccine (3 - 2023-24 season) 01/21/2024 (Originally 9/1/2023)    COLORECTAL CANCER SCREENING  10/26/2024    ANNUAL WELLNESS VISIT  11/01/2024    MAMMOGRAM  12/01/2024    TDAP/TD VACCINES (3 - Td or Tdap) 09/27/2032    ZOSTER VACCINE  Completed        CMS Preventative Services Quick Reference  Risk Factors Identified During Encounter    Immunizations Discussed/Encouraged: Influenza and Prevnar 20 (Pneumococcal 20-valent conjugate)  Dental Screening Recommended  Vision Screening Recommended  The above risks/problems have been discussed with the patient.  Pertinent information has been shared with the  patient in the After Visit Summary.  Follow up plans and orders are seen below in the Assessment/Plan Section.    Diagnoses and all orders for this visit:    1. Medicare annual wellness visit, initial (Primary)  -     TSH Rfx On Abnormal To Free T4; Future  -     CBC & Differential; Future  -     Comprehensive Metabolic Panel; Future  -     Hemoglobin A1c; Future  -     Lipid Panel; Future  -     Urinalysis With Microscopic - Urine, Clean Catch; Future    2. Postmenopause  -     DEXA Bone Density Axial; Future  -     TSH Rfx On Abnormal To Free T4; Future  -     CBC & Differential; Future  -     Comprehensive Metabolic Panel; Future  -     Hemoglobin A1c; Future  -     Lipid Panel; Future  -     Urinalysis With Microscopic - Urine, Clean Catch; Future    3. Need for pneumococcal vaccination  -     TSH Rfx On Abnormal To Free T4; Future  -     CBC & Differential; Future  -     Comprehensive Metabolic Panel; Future  -     Hemoglobin A1c; Future  -     Lipid Panel; Future  -     Urinalysis With Microscopic - Urine, Clean Catch; Future    4. Need for influenza vaccination  -     TSH Rfx On Abnormal To Free T4; Future  -     CBC & Differential; Future  -     Comprehensive Metabolic Panel; Future  -     Hemoglobin A1c; Future  -     Lipid Panel; Future  -     Urinalysis With Microscopic - Urine, Clean Catch; Future    5. Impaired fasting blood sugar  -     TSH Rfx On Abnormal To Free T4; Future  -     CBC & Differential; Future  -     Comprehensive Metabolic Panel; Future  -     Hemoglobin A1c; Future  -     Lipid Panel; Future  -     Urinalysis With Microscopic - Urine, Clean Catch; Future    6. Encounter for screening mammogram for malignant neoplasm of breast  -     Mammo Screening Digital Tomosynthesis Bilateral With CAD; Future  -     TSH Rfx On Abnormal To Free T4; Future  -     CBC & Differential; Future  -     Comprehensive Metabolic Panel; Future  -     Hemoglobin A1c; Future  -     Lipid Panel; Future  -      Urinalysis With Microscopic - Urine, Clean Catch; Future    7. Need for hepatitis C screening test  -     Hemoglobin A1c; Future  -     Hepatitis C Antibody; Future    8. Sinus congestion  -     Hemoglobin A1c; Future  -     azithromycin (Zithromax Z-Agustín) 250 MG tablet; Take 2 tablets by mouth on day 1, then 1 tablet daily on days 2-5  Dispense: 6 tablet; Refill: 0  -     predniSONE (DELTASONE) 20 MG tablet; Take 1 tablet by mouth Daily.  Dispense: 5 tablet; Refill: 0        Follow Up:   Initial Medicare Visit in one year    An After Visit Summary and PPPS were made available to the patient.

## 2023-12-04 ENCOUNTER — HOSPITAL ENCOUNTER (OUTPATIENT)
Dept: MAMMOGRAPHY | Facility: HOSPITAL | Age: 63
Discharge: HOME | End: 2023-12-04
Admitting: STUDENT IN AN ORGANIZED HEALTH CARE EDUCATION/TRAINING PROGRAM
Payer: MEDICARE

## 2023-12-04 DIAGNOSIS — Z12.31 VISIT FOR SCREENING MAMMOGRAM: ICD-10-CM

## 2023-12-04 PROCEDURE — 77063 BREAST TOMOSYNTHESIS BI: CPT

## 2023-12-04 PROCEDURE — 77067 SCR MAMMO BI INCL CAD: CPT

## 2024-01-16 ENCOUNTER — HOSPITAL ENCOUNTER (OUTPATIENT)
Dept: BONE DENSITY | Facility: HOSPITAL | Age: 64
Discharge: HOME | End: 2024-01-16
Admitting: STUDENT IN AN ORGANIZED HEALTH CARE EDUCATION/TRAINING PROGRAM
Payer: MEDICARE

## 2024-01-16 DIAGNOSIS — Z78.0 POSTMENOPAUSE: ICD-10-CM

## 2024-01-16 PROCEDURE — 77080 DXA BONE DENSITY AXIAL: CPT

## 2024-05-01 ENCOUNTER — OFFICE VISIT (OUTPATIENT)
Dept: FAMILY MEDICINE CLINIC | Facility: CLINIC | Age: 64
End: 2024-05-01
Payer: MEDICARE

## 2024-05-01 VITALS
OXYGEN SATURATION: 98 % | RESPIRATION RATE: 16 BRPM | SYSTOLIC BLOOD PRESSURE: 126 MMHG | HEART RATE: 68 BPM | BODY MASS INDEX: 21.79 KG/M2 | HEIGHT: 63 IN | DIASTOLIC BLOOD PRESSURE: 62 MMHG | WEIGHT: 123 LBS | TEMPERATURE: 98 F

## 2024-05-01 DIAGNOSIS — E78.2 MIXED HYPERLIPIDEMIA: ICD-10-CM

## 2024-05-01 DIAGNOSIS — J30.2 SEASONAL ALLERGIES: Primary | ICD-10-CM

## 2024-05-01 DIAGNOSIS — R73.03 PREDIABETES: ICD-10-CM

## 2024-05-01 PROCEDURE — 99214 OFFICE O/P EST MOD 30 MIN: CPT | Performed by: STUDENT IN AN ORGANIZED HEALTH CARE EDUCATION/TRAINING PROGRAM

## 2024-05-01 NOTE — PROGRESS NOTES
"Chief Complaint  Following up on prediabetes/hyperlipidemia    Subjective         Kasey Soler is a 63 y.o. female who presents to [facility]    63 years old comes to the clinic today to follow-up.    Hyperlipidemia; walking about 2 miles every day, trying to eat healthy diet.    Prediabetes; controlled and stable, adapting to healthy lifestyle and compliant    Seasonal allergies; controlled on Flonase/Zyrtec    12+ review of systems are unremarkable otherwise    Objective   Vital Signs:   Vitals:    05/01/24 0655   BP: 126/62   Pulse: 68   Resp: 16   Temp: 98 °F (36.7 °C)   SpO2: 98%   Weight: 55.8 kg (123 lb)   Height: 160 cm (63\")      Body mass index is 21.79 kg/m².   Wt Readings from Last 3 Encounters:   05/01/24 55.8 kg (123 lb)   11/01/23 55.2 kg (121 lb 9.6 oz)   03/29/23 56 kg (123 lb 8 oz)      BP Readings from Last 3 Encounters:   05/01/24 126/62   11/01/23 104/62   03/29/23 128/78        Patient Care Team:  Kar Castro MD as PCP - General (Family Medicine)     Physical Exam  Vitals reviewed.   Constitutional:       Appearance: Normal appearance. She is well-developed.   HENT:      Head: Normocephalic and atraumatic.      Right Ear: External ear normal.      Left Ear: External ear normal.      Mouth/Throat:      Pharynx: No oropharyngeal exudate.   Eyes:      Conjunctiva/sclera: Conjunctivae normal.      Pupils: Pupils are equal, round, and reactive to light.   Cardiovascular:      Rate and Rhythm: Normal rate and regular rhythm.      Heart sounds: No murmur heard.     No friction rub. No gallop.   Pulmonary:      Effort: Pulmonary effort is normal.      Breath sounds: Normal breath sounds. No wheezing or rhonchi.   Abdominal:      General: Bowel sounds are normal. There is no distension.      Palpations: Abdomen is soft.      Tenderness: There is no abdominal tenderness.   Skin:     General: Skin is warm and dry.   Neurological:      Mental Status: She is alert " and oriented to person, place, and time.      Cranial Nerves: No cranial nerve deficit.   Psychiatric:         Mood and Affect: Mood and affect normal.         Behavior: Behavior normal.         Thought Content: Thought content normal.         Judgment: Judgment normal.            BMI is within normal parameters. No other follow-up for BMI required.              Assessment and Plan   Diagnoses and all orders for this visit:    1. Seasonal allergies (Primary)  Comments:  Controlled on Zyrtec/Flonase/    2. Prediabetes  Comments:  Daily exercise and healthy diet recommended.    3. Mixed hyperlipidemia  Comments:  Low-carb diet/low-fat diet suggested          Tobacco Use: Low Risk  (11/1/2023)    Patient History     Smoking Tobacco Use: Never     Smokeless Tobacco Use: Never     Passive Exposure: Not on file            Follow Up   Return in about 6 months (around 11/1/2024).  Patient was given instructions and counseling regarding her condition or for health maintenance advice. Please see specific information pulled into the AVS if appropriate.

## 2024-10-01 ENCOUNTER — TRANSCRIBE ORDERS (OUTPATIENT)
Dept: ADMINISTRATIVE | Facility: HOSPITAL | Age: 64
End: 2024-10-01
Payer: MEDICARE

## 2024-10-01 DIAGNOSIS — Z12.31 SCREENING MAMMOGRAM, ENCOUNTER FOR: Primary | ICD-10-CM

## 2024-10-29 ENCOUNTER — TELEPHONE (OUTPATIENT)
Dept: FAMILY MEDICINE CLINIC | Facility: CLINIC | Age: 64
End: 2024-10-29
Payer: MEDICARE

## 2024-10-29 DIAGNOSIS — R73.03 PREDIABETES: Primary | ICD-10-CM

## 2024-10-29 DIAGNOSIS — Z00.00 MEDICARE ANNUAL WELLNESS VISIT, INITIAL: ICD-10-CM

## 2024-10-29 DIAGNOSIS — E78.2 MIXED HYPERLIPIDEMIA: ICD-10-CM

## 2024-10-29 NOTE — TELEPHONE ENCOUNTER
Caller: Kasey Soler    Relationship: Self    Best call back number: [phone]    What orders are you requesting (i.e. lab or imaging): BLOOD WORK     In what timeframe would the patient need to come in: PRIOR TO 11/1/24 APPOINTMENT     Where will you receive your lab/imaging services: [facility]

## 2024-10-31 ENCOUNTER — LAB (OUTPATIENT)
Dept: LAB | Facility: HOSPITAL | Age: 64
End: 2024-10-31
Payer: MEDICARE

## 2024-10-31 DIAGNOSIS — E78.2 MIXED HYPERLIPIDEMIA: ICD-10-CM

## 2024-10-31 DIAGNOSIS — Z00.00 MEDICARE ANNUAL WELLNESS VISIT, INITIAL: ICD-10-CM

## 2024-10-31 DIAGNOSIS — R73.03 PREDIABETES: ICD-10-CM

## 2024-10-31 LAB
ALBUMIN SERPL-MCNC: 4.4 G/DL (ref 3.5–5.2)
ALBUMIN UR-MCNC: <1.2 MG/DL
ALBUMIN/GLOB SERPL: 1.8 G/DL
ALP SERPL-CCNC: 76 U/L (ref 39–117)
ALT SERPL W P-5'-P-CCNC: 16 U/L (ref 1–33)
ANION GAP SERPL CALCULATED.3IONS-SCNC: 8 MMOL/L (ref 5–15)
AST SERPL-CCNC: 26 U/L (ref 1–32)
BASOPHILS # BLD AUTO: 0.02 10*3/MM3 (ref 0–0.2)
BASOPHILS NFR BLD AUTO: 0.5 % (ref 0–1.5)
BILIRUB SERPL-MCNC: 0.7 MG/DL (ref 0–1.2)
BUN SERPL-MCNC: 10 MG/DL (ref 8–23)
BUN/CREAT SERPL: 14.5 (ref 7–25)
CALCIUM SPEC-SCNC: 9.6 MG/DL (ref 8.6–10.5)
CHLORIDE SERPL-SCNC: 104 MMOL/L (ref 98–107)
CHOLEST SERPL-MCNC: 198 MG/DL (ref 0–200)
CO2 SERPL-SCNC: 28 MMOL/L (ref 22–29)
CREAT SERPL-MCNC: 0.69 MG/DL (ref 0.57–1)
CREAT UR-MCNC: 79 MG/DL
DEPRECATED RDW RBC AUTO: 41.4 FL (ref 37–54)
EGFRCR SERPLBLD CKD-EPI 2021: 95.9 ML/MIN/1.73
EOSINOPHIL # BLD AUTO: 0.12 10*3/MM3 (ref 0–0.4)
EOSINOPHIL NFR BLD AUTO: 3.2 % (ref 0.3–6.2)
ERYTHROCYTE [DISTWIDTH] IN BLOOD BY AUTOMATED COUNT: 12.2 % (ref 12.3–15.4)
GLOBULIN UR ELPH-MCNC: 2.4 GM/DL
GLUCOSE SERPL-MCNC: 101 MG/DL (ref 65–99)
HBA1C MFR BLD: 5.7 % (ref 4.8–5.6)
HCT VFR BLD AUTO: 39 % (ref 34–46.6)
HDLC SERPL-MCNC: 73 MG/DL (ref 40–60)
HGB BLD-MCNC: 12.8 G/DL (ref 12–15.9)
IMM GRANULOCYTES # BLD AUTO: 0.01 10*3/MM3 (ref 0–0.05)
IMM GRANULOCYTES NFR BLD AUTO: 0.3 % (ref 0–0.5)
LDLC SERPL CALC-MCNC: 115 MG/DL (ref 0–100)
LDLC/HDLC SERPL: 1.56 {RATIO}
LYMPHOCYTES # BLD AUTO: 1.17 10*3/MM3 (ref 0.7–3.1)
LYMPHOCYTES NFR BLD AUTO: 30.8 % (ref 19.6–45.3)
MCH RBC QN AUTO: 30.3 PG (ref 26.6–33)
MCHC RBC AUTO-ENTMCNC: 32.8 G/DL (ref 31.5–35.7)
MCV RBC AUTO: 92.2 FL (ref 79–97)
MICROALBUMIN/CREAT UR: NORMAL MG/G{CREAT}
MONOCYTES # BLD AUTO: 0.45 10*3/MM3 (ref 0.1–0.9)
MONOCYTES NFR BLD AUTO: 11.8 % (ref 5–12)
NEUTROPHILS NFR BLD AUTO: 2.03 10*3/MM3 (ref 1.7–7)
NEUTROPHILS NFR BLD AUTO: 53.4 % (ref 42.7–76)
NRBC BLD AUTO-RTO: 0 /100 WBC (ref 0–0.2)
PLATELET # BLD AUTO: 283 10*3/MM3 (ref 140–450)
PMV BLD AUTO: 9.7 FL (ref 6–12)
POTASSIUM SERPL-SCNC: 4.5 MMOL/L (ref 3.5–5.2)
PROT SERPL-MCNC: 6.8 G/DL (ref 6–8.5)
RBC # BLD AUTO: 4.23 10*6/MM3 (ref 3.77–5.28)
SODIUM SERPL-SCNC: 140 MMOL/L (ref 136–145)
TRIGL SERPL-MCNC: 56 MG/DL (ref 0–150)
TSH SERPL DL<=0.05 MIU/L-ACNC: 2.02 UIU/ML (ref 0.27–4.2)
VLDLC SERPL-MCNC: 10 MG/DL (ref 5–40)
WBC NRBC COR # BLD AUTO: 3.8 10*3/MM3 (ref 3.4–10.8)

## 2024-10-31 PROCEDURE — 82570 ASSAY OF URINE CREATININE: CPT | Performed by: STUDENT IN AN ORGANIZED HEALTH CARE EDUCATION/TRAINING PROGRAM

## 2024-10-31 PROCEDURE — 84443 ASSAY THYROID STIM HORMONE: CPT

## 2024-10-31 PROCEDURE — 82043 UR ALBUMIN QUANTITATIVE: CPT | Performed by: STUDENT IN AN ORGANIZED HEALTH CARE EDUCATION/TRAINING PROGRAM

## 2024-10-31 PROCEDURE — 36415 COLL VENOUS BLD VENIPUNCTURE: CPT

## 2024-10-31 PROCEDURE — 80053 COMPREHEN METABOLIC PANEL: CPT

## 2024-10-31 PROCEDURE — 85025 COMPLETE CBC W/AUTO DIFF WBC: CPT

## 2024-10-31 PROCEDURE — 80061 LIPID PANEL: CPT

## 2024-10-31 PROCEDURE — 83036 HEMOGLOBIN GLYCOSYLATED A1C: CPT

## 2024-11-01 ENCOUNTER — OFFICE VISIT (OUTPATIENT)
Dept: FAMILY MEDICINE CLINIC | Facility: CLINIC | Age: 64
End: 2024-11-01
Payer: MEDICARE

## 2024-11-01 VITALS
RESPIRATION RATE: 18 BRPM | WEIGHT: 123.2 LBS | BODY MASS INDEX: 21.83 KG/M2 | DIASTOLIC BLOOD PRESSURE: 79 MMHG | TEMPERATURE: 98 F | HEART RATE: 81 BPM | OXYGEN SATURATION: 97 % | SYSTOLIC BLOOD PRESSURE: 128 MMHG | HEIGHT: 63 IN

## 2024-11-01 DIAGNOSIS — Z23 NEED FOR IMMUNIZATION AGAINST INFLUENZA: ICD-10-CM

## 2024-11-01 DIAGNOSIS — J30.2 SEASONAL ALLERGIES: Primary | ICD-10-CM

## 2024-11-01 PROCEDURE — 1159F MED LIST DOCD IN RCRD: CPT | Performed by: STUDENT IN AN ORGANIZED HEALTH CARE EDUCATION/TRAINING PROGRAM

## 2024-11-01 PROCEDURE — 1160F RVW MEDS BY RX/DR IN RCRD: CPT | Performed by: STUDENT IN AN ORGANIZED HEALTH CARE EDUCATION/TRAINING PROGRAM

## 2024-11-01 PROCEDURE — 90662 IIV NO PRSV INCREASED AG IM: CPT | Performed by: STUDENT IN AN ORGANIZED HEALTH CARE EDUCATION/TRAINING PROGRAM

## 2024-11-01 PROCEDURE — 1126F AMNT PAIN NOTED NONE PRSNT: CPT | Performed by: STUDENT IN AN ORGANIZED HEALTH CARE EDUCATION/TRAINING PROGRAM

## 2024-11-01 PROCEDURE — G0008 ADMIN INFLUENZA VIRUS VAC: HCPCS | Performed by: STUDENT IN AN ORGANIZED HEALTH CARE EDUCATION/TRAINING PROGRAM

## 2024-11-01 PROCEDURE — 99213 OFFICE O/P EST LOW 20 MIN: CPT | Performed by: STUDENT IN AN ORGANIZED HEALTH CARE EDUCATION/TRAINING PROGRAM

## 2024-11-01 RX ORDER — FLUTICASONE PROPIONATE 50 MCG
2 SPRAY, SUSPENSION (ML) NASAL DAILY
Qty: 16 G | Refills: 1 | Status: SHIPPED | OUTPATIENT
Start: 2024-11-01

## 2024-11-01 NOTE — PROGRESS NOTES
"Chief Complaint  Following up on seasonal allergy and recent blood work    Subjective         Kasey Soler is a 64 y.o. female who presents to [facility]    64 years old comes to the clinic today to follow-up.    Patient is not due for annual wellness.    Would like to get refill on Flonase.  Would like to review her recent blood work that she had done before this visit.    12+ review of systems are unremarkable otherwise.  Physically very active without any difficulties.    Objective   Vital Signs:   Vitals:    11/01/24 0710   BP: 128/79   Pulse: 81   Resp: 18   Temp: 98 °F (36.7 °C)   SpO2: 97%   Weight: 55.9 kg (123 lb 3.2 oz)   Height: 160 cm (63\")      Body mass index is 21.82 kg/m².   Wt Readings from Last 3 Encounters:   11/01/24 55.9 kg (123 lb 3.2 oz)   05/01/24 55.8 kg (123 lb)   11/01/23 55.2 kg (121 lb 9.6 oz)      BP Readings from Last 3 Encounters:   11/01/24 128/79   05/01/24 126/62   11/01/23 104/62        Patient Care Team:  Kar Castro MD as PCP - General (Family Medicine)     Physical Exam  Vitals reviewed.   Constitutional:       Appearance: Normal appearance. She is well-developed.   HENT:      Head: Normocephalic and atraumatic.      Right Ear: External ear normal.      Left Ear: External ear normal.      Mouth/Throat:      Pharynx: No oropharyngeal exudate.   Eyes:      Conjunctiva/sclera: Conjunctivae normal.      Pupils: Pupils are equal, round, and reactive to light.   Cardiovascular:      Rate and Rhythm: Normal rate and regular rhythm.      Heart sounds: No murmur heard.     No friction rub. No gallop.   Pulmonary:      Effort: Pulmonary effort is normal.      Breath sounds: Normal breath sounds. No wheezing or rhonchi.   Abdominal:      General: Bowel sounds are normal. There is no distension.      Palpations: Abdomen is soft.      Tenderness: There is no abdominal tenderness.   Skin:     General: Skin is warm and dry.   Neurological:      Mental " Subjective   Patient ID: Se is a 12 month old female who is accompanied by:mother     Last seen at 9 months at which time she was has improved on her ASQ gross motor skills.  Mom feels that she is doing well. Did have fever with cough x 3 days last month that improved with just supportive care. No hospitalizations, no ER visits.     Concerns: Mom states that she hasn't started walking yet and when she cruises she will mostly walk on her toes. Mom states that she will stand with both feet flat on the ground. Does use a baby walker.      Baby does drink Nido (~18 oz /day). Mom has tried giving whole milk or 2% milk but baby would not take it. Has tried 4-5 times. Mom states that she is going to keep trying to transition Se to whole milk.     Well Child Assessment:  History was provided by the mother. Se lives with her father, mother, grandmother, grandfather, aunt and uncle. Interval problems do not include caregiver depression or caregiver stress.   Nutrition  Types of milk consumed include cow's milk and formula. 18 ounces of milk or formula are consumed every 24 hours. Types of cereal consumed include barley, rice and oat. Types of intake include meats, vegetables, fruits, eggs, cereals and juices (6oz). There are no difficulties with feeding.   Dental  The patient has a dental home. The patient has teething symptoms. Tooth eruption is beginning.  Elimination  Elimination problems do not include constipation or diarrhea. (Every other day)   Sleep  The patient sleeps in her crib. Child falls asleep while bottle is in crib. Average sleep duration is 9 hours.   Safety  Home is child-proofed? yes. There is no smoking in the home. Home has working smoke alarms? yes. Home has working carbon monoxide alarms? yes. There is an appropriate car seat in use.   Screening  Immunizations are not up-to-date. There are no risk factors for hearing loss. There are no risk factors for tuberculosis. There are no risk  Status: She is alert and oriented to person, place, and time.      Cranial Nerves: No cranial nerve deficit.   Psychiatric:         Mood and Affect: Mood and affect normal.         Behavior: Behavior normal.         Thought Content: Thought content normal.         Judgment: Judgment normal.          BMI is within normal parameters. No other follow-up for BMI required.              Assessment and Plan   Diagnoses and all orders for this visit:    1. Seasonal allergies (Primary)  -     fluticasone (FLONASE) 50 MCG/ACT nasal spray; Administer 2 sprays into the nostril(s) as directed by provider Daily.  Dispense: 16 g; Refill: 1    2. Need for immunization against influenza  -     Fluzone High-Dose 65+yrs      Recent blood work reviewed with patient.    Patient to come back in 2 weeks for Medicare annual wellness    Tobacco Use: Low Risk  (11/1/2024)    Patient History    • Smoking Tobacco Use: Never    • Smokeless Tobacco Use: Never    • Passive Exposure: Not on file            Follow Up   Return in about 2 weeks (around 11/15/2024) for Medicare Wellness.  Patient was given instructions and counseling regarding her condition or for health maintenance advice. Please see specific information pulled into the AVS if appropriate.        factors for lead toxicity.   Social  The caregiver does not enjoy the child. Childcare is provided at child's home. The childcare provider is a parent.       Additional concerns today: None     Review of Systems   Constitutional: Negative for fever.   HENT: Negative for congestion and rhinorrhea.    Respiratory: Negative for cough.    Gastrointestinal: Negative for constipation, diarrhea and vomiting.   Genitourinary: Negative for decreased urine volume.   Skin: Negative for color change and rash.       Patient's medications, allergies, past medical, surgical, social and family histories were reviewed and updated as appropriate.    Objective   Vitals: Pulse 136   Temp 98.1 °F (36.7 °C) (Temporal)   Resp 24   Ht 28.54\" (72.5 cm)   Wt 9.55 kg (21 lb 0.9 oz)   HC 44.5 cm (17.52\")   BMI 18.17 kg/m²   BSA 0.42 m²   Growth parameters are noted and are appropriate for age.    Physical Exam  Constitutional:       General: She is active. She is not in acute distress.     Appearance: Normal appearance. She is not toxic-appearing.   HENT:      Head: Normocephalic and atraumatic.      Right Ear: Tympanic membrane, ear canal and external ear normal.      Left Ear: Tympanic membrane, ear canal and external ear normal.      Nose: Nose normal. No congestion.      Mouth/Throat:      Mouth: Mucous membranes are moist.   Eyes:      General:         Right eye: No discharge.         Left eye: No discharge.      Extraocular Movements: Extraocular movements intact.      Conjunctiva/sclera: Conjunctivae normal.   Cardiovascular:      Rate and Rhythm: Normal rate and regular rhythm.      Pulses: Normal pulses.      Heart sounds: Normal heart sounds. No murmur heard.  Pulmonary:      Effort: Pulmonary effort is normal. No respiratory distress.      Breath sounds: Normal breath sounds.   Abdominal:      General: Bowel sounds are normal. There is no distension.      Palpations: Abdomen is soft. There is no mass.   Genitourinary:      General: Normal vulva.      Rectum: Normal.   Musculoskeletal:         General: No swelling or signs of injury.      Comments: Full ROM of ankles bilaterally. Able to stand flat footed.    Lymphadenopathy:      Cervical: No cervical adenopathy.   Skin:     General: Skin is warm.      Capillary Refill: Capillary refill takes less than 2 seconds.      Findings: No rash.   Neurological:      Mental Status: She is alert.         Assessment   Problem List Items Addressed This Visit        Symptoms and Signs    Toe-walking      Other Visit Diagnoses     Encounter for routine child health examination without abnormal findings    -  Primary    Need for vaccination        Relevant Orders    VARICELLA CHICKEN POX LIVES VACC, SQ (VARIVAX) (Completed)    MMR VACC, SQ (Completed)    HEPATITIS A VACCINE PEDIATRIC / ADOLESCENT 2 DOSE IM (Completed)         Se Escobar is a 12 month old female with a history of presenting for the 12 month health maintenance visit. Patient has overall been well, Mom with concerns that she in not walking and mainly cruising on her toes. Provided reassurance and discussed continuing to allow her to cruise and working with her to walk and removing baby walker from house. Discussed that while Nido contains the appropriate fat, may contain less vitamin D so she should provide supplementation while only drinking Nido. Baby gaining ~20 g/day since last visit, no large changes to diet suggested today as it sounds well varied and without excess non-nutrient rich snacks, will CTM.     HMV  - Age appropriate anticipatory guidance discussed  - Bright Futures Handout provided  - Reach Out and Read book provided  - Vaccines due at today's visit: MMR, Varicella, Hep A. COVID vaccine deferred, will discuss with father. Informational packet given.   - Provided acetaminophen/ibuprofen dosing chart  - Ages and Stages Questionnaire was administered. Score entered into Screenings tab.   - Discussed exercises to  continue doing at home. Will reassess at 15 month visit. If continues to have some delay, will plan to refer to EI. Mom agreeable with plan.   - Fluoride varnish applied  - Provided list of local dental providers and contact information for the [facility]  - Recommended Vitamin D supplementation, limiting unhealthy snacks, no more than 24 oz milk per day.     Toe Walking:  - Discussed that this can be normal at 1 year of age, provided reassurance   - Discussed removing baby walker from home      Screening tests  ASQ-3 Screen    Results: Some Concerns/Monitor   Communication: Reassuring Score: 40   Gross Motor: Monitoring Score: 35   Fine Motor: Reassuring Score: 45   Problem Solving: Monitoring Score: 30   Personal-Social: Monitoring Score: 30   Other Concerns: Other            Disposition: Repeat screen, exercises provided   Repeat Screening: 3 months  M-CHAT Performed?: No          Developmental milestones were reviewed and were: Monitoring    Immunizations today: per orders.  History of previous adverse reactions to immunizations? no  Immunizations given today and vaccine counseling including benefits, risks, and adverse reactions were provided by myself during the visit.    Follow-up for the 15 month well child visit, or sooner as needed.    Discussed with Dr. Gonzalez.

## 2024-11-15 ENCOUNTER — OFFICE VISIT (OUTPATIENT)
Dept: FAMILY MEDICINE CLINIC | Facility: CLINIC | Age: 64
End: 2024-11-15
Payer: MEDICARE

## 2024-11-15 VITALS
HEART RATE: 102 BPM | DIASTOLIC BLOOD PRESSURE: 70 MMHG | SYSTOLIC BLOOD PRESSURE: 132 MMHG | BODY MASS INDEX: 21.79 KG/M2 | HEIGHT: 63 IN | WEIGHT: 123 LBS | OXYGEN SATURATION: 95 % | RESPIRATION RATE: 16 BRPM | TEMPERATURE: 97.7 F

## 2024-11-15 DIAGNOSIS — Z00.00 MEDICARE ANNUAL WELLNESS VISIT, SUBSEQUENT: Primary | ICD-10-CM

## 2024-11-15 PROCEDURE — 1159F MED LIST DOCD IN RCRD: CPT | Performed by: STUDENT IN AN ORGANIZED HEALTH CARE EDUCATION/TRAINING PROGRAM

## 2024-11-15 PROCEDURE — G0439 PPPS, SUBSEQ VISIT: HCPCS | Performed by: STUDENT IN AN ORGANIZED HEALTH CARE EDUCATION/TRAINING PROGRAM

## 2024-11-15 PROCEDURE — 1126F AMNT PAIN NOTED NONE PRSNT: CPT | Performed by: STUDENT IN AN ORGANIZED HEALTH CARE EDUCATION/TRAINING PROGRAM

## 2024-11-15 PROCEDURE — 1170F FXNL STATUS ASSESSED: CPT | Performed by: STUDENT IN AN ORGANIZED HEALTH CARE EDUCATION/TRAINING PROGRAM

## 2024-11-15 PROCEDURE — 1160F RVW MEDS BY RX/DR IN RCRD: CPT | Performed by: STUDENT IN AN ORGANIZED HEALTH CARE EDUCATION/TRAINING PROGRAM

## 2024-11-15 NOTE — PROGRESS NOTES
Subjective   The ABCs of the Annual Wellness Visit  Medicare Wellness Visit      Kasey Soler is a 64 y.o. patient who presents for a Medicare Wellness Visit.    The following portions of the patient's history were reviewed and   updated as appropriate: allergies, current medications, past family history, past medical history, past social history, past surgical history, and problem list.    Compared to one year ago, the patient's physical   health is the same.  Compared to one year ago, the patient's mental   health is the same.    Recent Hospitalizations:  She was not admitted to the hospital during the last year.     Current Medical Providers:  Patient Care Team:  Kar Castro MD as PCP - General (Family Medicine)    Outpatient Medications Prior to Visit   Medication Sig Dispense Refill    cetirizine (zyrTEC) 10 MG tablet Zyrtec 10 mg oral tablet take 1 tablet (10 mg) by oral route once daily   Active      fluticasone (FLONASE) 50 MCG/ACT nasal spray Administer 2 sprays into the nostril(s) as directed by provider Daily. 16 g 1     No facility-administered medications prior to visit.     No opioid medication identified on active medication list. I have reviewed chart for other potential  high risk medication/s and harmful drug interactions in the elderly.      Aspirin is not on active medication list.  Aspirin use is not indicated based on review of current medical condition/s. Risk of harm outweighs potential benefits.  .    Patient Active Problem List   Diagnosis    Seasonal allergies     Advance Care Planning Advance Directive is not on file.  ACP discussion was held with the patient during this visit. Patient does not have an advance directive, information provided.            Objective   Vitals:    11/15/24 0709   BP: 132/70   BP Location: Left arm   Patient Position: Sitting   Cuff Size: Adult   Pulse: 102   Resp: 16   Temp: 97.7 °F (36.5 °C)   TempSrc: Temporal   SpO2: 95%   Weight: 55.8 kg (123 lb)  "  Height: 160 cm (63\")   PainSc: 0-No pain       Estimated body mass index is 21.79 kg/m² as calculated from the following:    Height as of this encounter: 160 cm (63\").    Weight as of this encounter: 55.8 kg (123 lb).    BMI is within normal parameters. No other follow-up for BMI required.       Does the patient have evidence of cognitive impairment? No  Lab Results   Component Value Date    TRIG 56 10/31/2024    HDL 73 (H) 10/31/2024     (H) 10/31/2024    VLDL 10 10/31/2024    HGBA1C 5.70 (H) 10/31/2024                                                                                               Health  Risk Assessment    Smoking Status:  Social History     Tobacco Use   Smoking Status Never   Smokeless Tobacco Never     Alcohol Consumption:  Social History     Substance and Sexual Activity   Alcohol Use Never       Fall Risk Screen  STEADI Fall Risk Assessment was completed, and patient is at LOW risk for falls.Assessment completed on:11/15/2024    Depression Screening   Little interest or pleasure in doing things? Not at all   Feeling down, depressed, or hopeless? Not at all   PHQ-2 Total Score 0      Health Habits and Functional and Cognitive Screenin/15/2024     7:00 AM   Functional & Cognitive Status   Do you have difficulty preparing food and eating? No   Do you have difficulty bathing yourself, getting dressed or grooming yourself? No   Do you have difficulty using the toilet? No   Do you have difficulty moving around from place to place? No   Do you have trouble with steps or getting out of a bed or a chair? No   Current Diet Well Balanced Diet   Dental Exam Up to date   Eye Exam Up to date   Exercise (times per week) 5 times per week   Current Exercises Include Walking   Do you need help using the phone?  No   Are you deaf or do you have serious difficulty hearing?  No   Do you need help to go to places out of walking distance? No   Do you need help shopping? No   Do you need help " preparing meals?  No   Do you need help with housework?  No   Do you need help with laundry? No   Do you need help taking your medications? No   Do you need help managing money? No   Do you ever drive or ride in a car without wearing a seat belt? No   Have you felt unusual stress, anger or loneliness in the last month? No   Who do you live with? Spouse   If you need help, do you have trouble finding someone available to you? No   Have you been bothered in the last four weeks by sexual problems? No   Do you have difficulty concentrating, remembering or making decisions? No           Age-appropriate Screening Schedule:  Refer to the list below for future screening recommendations based on patient's age, sex and/or medical conditions. Orders for these recommended tests are listed in the plan section. The patient has been provided with a written plan.    Health Maintenance List  Health Maintenance   Topic Date Due    COLORECTAL CANCER SCREENING  10/26/2024    COVID-19 Vaccine (3 - 2024-25 season) 01/21/2025 (Originally 9/1/2024)    LIPID PANEL  10/31/2025    ANNUAL WELLNESS VISIT  11/15/2025    MAMMOGRAM  12/04/2025    DXA SCAN  01/16/2026    TDAP/TD VACCINES (3 - Td or Tdap) 09/27/2032    HEPATITIS C SCREENING  Completed    INFLUENZA VACCINE  Completed    Pneumococcal Vaccine 65+  Completed    ZOSTER VACCINE  Completed                                                                                                                                                CMS Preventative Services Quick Reference  Risk Factors Identified During Encounter  Dental Screening Recommended  Vision Screening Recommended    The above risks/problems have been discussed with the patient.  Pertinent information has been shared with the patient in the After Visit Summary.  An After Visit Summary and PPPS were made available to the patient.    Follow Up:   Next Medicare Wellness visit to be scheduled in 1 year.     Assessment & Plan  Medicare  annual wellness visit, subsequent              Follow Up:   Return if symptoms worsen or fail to improve.

## 2024-12-05 ENCOUNTER — HOSPITAL ENCOUNTER (OUTPATIENT)
Dept: MAMMOGRAPHY | Facility: HOSPITAL | Age: 64
Discharge: HOME | End: 2024-12-05
Admitting: STUDENT IN AN ORGANIZED HEALTH CARE EDUCATION/TRAINING PROGRAM
Payer: MEDICARE

## 2024-12-05 DIAGNOSIS — Z12.31 SCREENING MAMMOGRAM, ENCOUNTER FOR: ICD-10-CM

## 2024-12-05 PROCEDURE — 77067 SCR MAMMO BI INCL CAD: CPT

## 2024-12-05 PROCEDURE — 77063 BREAST TOMOSYNTHESIS BI: CPT

## 2024-12-20 ENCOUNTER — TELEPHONE (OUTPATIENT)
Dept: FAMILY MEDICINE CLINIC | Facility: CLINIC | Age: 64
End: 2024-12-20
Payer: MEDICARE

## 2024-12-20 RX ORDER — PREDNISONE 20 MG/1
20 TABLET ORAL DAILY
Qty: 5 TABLET | Refills: 0 | Status: SHIPPED | OUTPATIENT
Start: 2024-12-20

## 2024-12-20 RX ORDER — BROMPHENIRAMINE MALEATE, PSEUDOEPHEDRINE HYDROCHLORIDE, AND DEXTROMETHORPHAN HYDROBROMIDE 2; 30; 10 MG/5ML; MG/5ML; MG/5ML
7 SYRUP ORAL 4 TIMES DAILY PRN
Qty: 118 ML | Refills: 0 | Status: SHIPPED | OUTPATIENT
Start: 2024-12-20

## 2024-12-20 RX ORDER — AZITHROMYCIN 250 MG/1
TABLET, FILM COATED ORAL
Qty: 6 TABLET | Refills: 0 | Status: SHIPPED | OUTPATIENT
Start: 2024-12-20

## 2024-12-20 NOTE — TELEPHONE ENCOUNTER
Caller: Kasey Soler    Relationship: Self    Best call back number: [phone]     What medication are you requesting: SOMETHING FOR SINUS    What are your current symptoms: HEADACHE, CONGESTION, SINUS PRESSURE    How long have you been experiencing symptoms: 1 WEEK    Have you had these symptoms before:    [x] Yes  [] No    Have you been treated for these symptoms before:   [x] Yes  [] No    If a prescription is needed, what is your preferred pharmacy and phone number:  [facility] [address] [place] [address] [phone] [facility] [phone]      Additional notes:

## 2025-04-21 ENCOUNTER — APPOINTMENT (OUTPATIENT)
Dept: GENERAL RADIOLOGY | Facility: HOSPITAL | Age: 65
End: 2025-04-21
Payer: MEDICARE

## 2025-04-21 ENCOUNTER — APPOINTMENT (OUTPATIENT)
Dept: CT IMAGING | Facility: HOSPITAL | Age: 65
End: 2025-04-21
Payer: MEDICARE

## 2025-04-21 ENCOUNTER — HOSPITAL ENCOUNTER (EMERGENCY)
Facility: HOSPITAL | Age: 65
Discharge: HOME | End: 2025-04-21
Attending: EMERGENCY MEDICINE | Admitting: EMERGENCY MEDICINE
Payer: MEDICARE

## 2025-04-21 VITALS
TEMPERATURE: 97.3 F | HEART RATE: 74 BPM | RESPIRATION RATE: 16 BRPM | WEIGHT: 124.12 LBS | BODY MASS INDEX: 21.99 KG/M2 | SYSTOLIC BLOOD PRESSURE: 131 MMHG | OXYGEN SATURATION: 96 % | HEIGHT: 63 IN | DIASTOLIC BLOOD PRESSURE: 91 MMHG

## 2025-04-21 DIAGNOSIS — N39.0 ACUTE UTI: ICD-10-CM

## 2025-04-21 DIAGNOSIS — R51.9 NONINTRACTABLE HEADACHE, UNSPECIFIED CHRONICITY PATTERN, UNSPECIFIED HEADACHE TYPE: Primary | ICD-10-CM

## 2025-04-21 LAB
ALBUMIN SERPL-MCNC: 4.7 G/DL (ref 3.5–5.2)
ALBUMIN/GLOB SERPL: 1.7 G/DL
ALP SERPL-CCNC: 81 U/L (ref 39–117)
ALT SERPL W P-5'-P-CCNC: 12 U/L (ref 1–33)
ANION GAP SERPL CALCULATED.3IONS-SCNC: 13.9 MMOL/L (ref 5–15)
AST SERPL-CCNC: 21 U/L (ref 1–32)
BACTERIA UR QL AUTO: ABNORMAL /HPF
BASOPHILS # BLD AUTO: 0.01 10*3/MM3 (ref 0–0.2)
BASOPHILS NFR BLD AUTO: 0.2 % (ref 0–1.5)
BILIRUB SERPL-MCNC: 0.9 MG/DL (ref 0–1.2)
BILIRUB UR QL STRIP: NEGATIVE
BUN SERPL-MCNC: 6 MG/DL (ref 8–23)
BUN/CREAT SERPL: 10.7 (ref 7–25)
CALCIUM SPEC-SCNC: 9.6 MG/DL (ref 8.6–10.5)
CHLORIDE SERPL-SCNC: 99 MMOL/L (ref 98–107)
CLARITY UR: CLEAR
CO2 SERPL-SCNC: 25.1 MMOL/L (ref 22–29)
COLOR UR: YELLOW
CREAT SERPL-MCNC: 0.56 MG/DL (ref 0.57–1)
DEPRECATED RDW RBC AUTO: 41 FL (ref 37–54)
EGFRCR SERPLBLD CKD-EPI 2021: 100.8 ML/MIN/1.73
EOSINOPHIL # BLD AUTO: 0 10*3/MM3 (ref 0–0.4)
EOSINOPHIL NFR BLD AUTO: 0 % (ref 0.3–6.2)
ERYTHROCYTE [DISTWIDTH] IN BLOOD BY AUTOMATED COUNT: 12.4 % (ref 12.3–15.4)
FLUAV RNA RESP QL NAA+PROBE: NOT DETECTED
FLUBV RNA RESP QL NAA+PROBE: NOT DETECTED
GLOBULIN UR ELPH-MCNC: 2.7 GM/DL
GLUCOSE SERPL-MCNC: 142 MG/DL (ref 65–99)
GLUCOSE UR STRIP-MCNC: NEGATIVE MG/DL
HCT VFR BLD AUTO: 41.6 % (ref 34–46.6)
HGB BLD-MCNC: 14.2 G/DL (ref 12–15.9)
HGB UR QL STRIP.AUTO: NEGATIVE
HOLD SPECIMEN: NORMAL
HOLD SPECIMEN: NORMAL
HYALINE CASTS UR QL AUTO: ABNORMAL /LPF
IMM GRANULOCYTES # BLD AUTO: 0.01 10*3/MM3 (ref 0–0.05)
IMM GRANULOCYTES NFR BLD AUTO: 0.2 % (ref 0–0.5)
KETONES UR QL STRIP: NEGATIVE
LEUKOCYTE ESTERASE UR QL STRIP.AUTO: ABNORMAL
LYMPHOCYTES # BLD AUTO: 0.87 10*3/MM3 (ref 0.7–3.1)
LYMPHOCYTES NFR BLD AUTO: 15 % (ref 19.6–45.3)
MAGNESIUM SERPL-MCNC: 2.2 MG/DL (ref 1.6–2.4)
MCH RBC QN AUTO: 31 PG (ref 26.6–33)
MCHC RBC AUTO-ENTMCNC: 34.1 G/DL (ref 31.5–35.7)
MCV RBC AUTO: 90.8 FL (ref 79–97)
MONOCYTES # BLD AUTO: 0.26 10*3/MM3 (ref 0.1–0.9)
MONOCYTES NFR BLD AUTO: 4.5 % (ref 5–12)
NEUTROPHILS NFR BLD AUTO: 4.65 10*3/MM3 (ref 1.7–7)
NEUTROPHILS NFR BLD AUTO: 80.1 % (ref 42.7–76)
NITRITE UR QL STRIP: NEGATIVE
NRBC BLD AUTO-RTO: 0 /100 WBC (ref 0–0.2)
PH UR STRIP.AUTO: 7 [PH] (ref 5–8)
PLATELET # BLD AUTO: 261 10*3/MM3 (ref 140–450)
PMV BLD AUTO: 8.9 FL (ref 6–12)
POTASSIUM SERPL-SCNC: 4.2 MMOL/L (ref 3.5–5.2)
PROT SERPL-MCNC: 7.4 G/DL (ref 6–8.5)
PROT UR QL STRIP: NEGATIVE
RBC # BLD AUTO: 4.58 10*6/MM3 (ref 3.77–5.28)
RBC # UR STRIP: ABNORMAL /HPF
REF LAB TEST METHOD: ABNORMAL
RSV RNA RESP QL NAA+PROBE: NOT DETECTED
SARS-COV-2 RNA RESP QL NAA+PROBE: NOT DETECTED
SODIUM SERPL-SCNC: 138 MMOL/L (ref 136–145)
SP GR UR STRIP: 1.01 (ref 1–1.03)
SQUAMOUS #/AREA URNS HPF: ABNORMAL /HPF
TROPONIN T SERPL HS-MCNC: <6 NG/L
UROBILINOGEN UR QL STRIP: ABNORMAL
WBC # UR STRIP: ABNORMAL /HPF
WBC NRBC COR # BLD AUTO: 5.8 10*3/MM3 (ref 3.4–10.8)
WHOLE BLOOD HOLD COAG: NORMAL
WHOLE BLOOD HOLD SPECIMEN: NORMAL

## 2025-04-21 PROCEDURE — 93005 ELECTROCARDIOGRAM TRACING: CPT

## 2025-04-21 PROCEDURE — 85025 COMPLETE CBC W/AUTO DIFF WBC: CPT

## 2025-04-21 PROCEDURE — 36415 COLL VENOUS BLD VENIPUNCTURE: CPT

## 2025-04-21 PROCEDURE — 81001 URINALYSIS AUTO W/SCOPE: CPT | Performed by: EMERGENCY MEDICINE

## 2025-04-21 PROCEDURE — 25010000002 DIPHENHYDRAMINE PER 50 MG: Performed by: EMERGENCY MEDICINE

## 2025-04-21 PROCEDURE — 71045 X-RAY EXAM CHEST 1 VIEW: CPT

## 2025-04-21 PROCEDURE — 84484 ASSAY OF TROPONIN QUANT: CPT

## 2025-04-21 PROCEDURE — 25010000002 KETOROLAC TROMETHAMINE PER 15 MG: Performed by: EMERGENCY MEDICINE

## 2025-04-21 PROCEDURE — 96375 TX/PRO/DX INJ NEW DRUG ADDON: CPT

## 2025-04-21 PROCEDURE — 87637 SARSCOV2&INF A&B&RSV AMP PRB: CPT

## 2025-04-21 PROCEDURE — 25810000003 SODIUM CHLORIDE 0.9 % SOLUTION: Performed by: EMERGENCY MEDICINE

## 2025-04-21 PROCEDURE — 87086 URINE CULTURE/COLONY COUNT: CPT

## 2025-04-21 PROCEDURE — 80053 COMPREHEN METABOLIC PANEL: CPT

## 2025-04-21 PROCEDURE — 96374 THER/PROPH/DIAG INJ IV PUSH: CPT

## 2025-04-21 PROCEDURE — 25010000002 METOCLOPRAMIDE PER 10 MG: Performed by: EMERGENCY MEDICINE

## 2025-04-21 PROCEDURE — 99284 EMERGENCY DEPT VISIT MOD MDM: CPT

## 2025-04-21 PROCEDURE — 70450 CT HEAD/BRAIN W/O DYE: CPT

## 2025-04-21 PROCEDURE — 83735 ASSAY OF MAGNESIUM: CPT

## 2025-04-21 PROCEDURE — 93005 ELECTROCARDIOGRAM TRACING: CPT | Performed by: EMERGENCY MEDICINE

## 2025-04-21 RX ORDER — CEPHALEXIN 500 MG/1
500 CAPSULE ORAL 2 TIMES DAILY
Qty: 14 CAPSULE | Refills: 0 | Status: SHIPPED | OUTPATIENT
Start: 2025-04-21 | End: 2025-04-28

## 2025-04-21 RX ORDER — KETOROLAC TROMETHAMINE 30 MG/ML
30 INJECTION, SOLUTION INTRAMUSCULAR; INTRAVENOUS ONCE
Status: COMPLETED | OUTPATIENT
Start: 2025-04-21 | End: 2025-04-21

## 2025-04-21 RX ORDER — SODIUM CHLORIDE 0.9 % (FLUSH) 0.9 %
10 SYRINGE (ML) INJECTION AS NEEDED
Status: DISCONTINUED | OUTPATIENT
Start: 2025-04-21 | End: 2025-04-21 | Stop reason: HOSPADM

## 2025-04-21 RX ORDER — DIPHENHYDRAMINE HYDROCHLORIDE 50 MG/ML
25 INJECTION, SOLUTION INTRAMUSCULAR; INTRAVENOUS ONCE
Status: COMPLETED | OUTPATIENT
Start: 2025-04-21 | End: 2025-04-21

## 2025-04-21 RX ORDER — METOCLOPRAMIDE HYDROCHLORIDE 5 MG/ML
10 INJECTION INTRAMUSCULAR; INTRAVENOUS ONCE
Status: COMPLETED | OUTPATIENT
Start: 2025-04-21 | End: 2025-04-21

## 2025-04-21 RX ADMIN — KETOROLAC TROMETHAMINE 30 MG: 30 INJECTION, SOLUTION INTRAMUSCULAR; INTRAVENOUS at 17:16

## 2025-04-21 RX ADMIN — METOCLOPRAMIDE 10 MG: 5 INJECTION, SOLUTION INTRAMUSCULAR; INTRAVENOUS at 17:24

## 2025-04-21 RX ADMIN — SODIUM CHLORIDE 1000 ML: 9 INJECTION, SOLUTION INTRAVENOUS at 17:12

## 2025-04-21 RX ADMIN — DIPHENHYDRAMINE HYDROCHLORIDE 25 MG: 50 INJECTION, SOLUTION INTRAMUSCULAR; INTRAVENOUS at 17:14

## 2025-04-21 NOTE — ED PROVIDER NOTES
Time: 3:50 PM EDT  Date of encounter:  4/21/2025  Independent Historian/Clinical History and Information was obtained by:   Patient    History is limited by: N/A    Chief Complaint   Patient presents with    Dizziness    Headache         History of Present Illness:  Patient is a 64 y.o. year old female who presents to the emergency department for evaluation of dizziness and headache started 3 days ago.  States that her headache is 10/10. Reports nausea and vomiting that started yesterday. Denies any recent sickness. Patient denies history of migraine or hitting her head. (Triage Provider: Maged Zhu PA-C).       Patient states her headache is in the frontal area.  At first she thought it was a sinus infection but states it did not improve the next day and typically her sinus pain improves in a short time.  Patient states the dizziness has improved however she was also getting dizzy with laying down.  She states the dizziness also worsens when she is up and active.  Is not on a blood thinner.  Denies fever and cough or exposure to illness.  [facility]      Patient Care Team  Primary Care Provider: Kar Castro MD    Past Medical History:     No Known Allergies  Past Medical History:   Diagnosis Date    Allergic     Colon cancer screening 07/2018    Hemoccult NL    History of gestational diabetes 06/15/2016    History of screening mammography 08/2019    Normal     Past Surgical History:   Procedure Laterality Date    APPENDECTOMY  1995    BASAL CELL CARCINOMA EXCISION  04/01/2015    HYSTERECTOMY  1995     Family History   Problem Relation Age of Onset    Lung cancer Paternal Grandfather     Kidney disease Other        Home Medications:  Prior to Admission medications    Medication Sig Start Date End Date Taking? Authorizing Provider   azithromycin (Zithromax Z-Agustín) 250 MG tablet Take 2 tablets by mouth on day 1, then 1 tablet daily on days 2-5 12/20/24 4/21/25  Kar Castro MD  "  brompheniramine-pseudoephedrine-DM 30-2-10 MG/5ML syrup Take 7 mL by mouth 4 (Four) Times a Day As Needed for Congestion or Cough. 12/20/24 4/21/25  Kar Castro MD   cetirizine (zyrTEC) 10 MG tablet Zyrtec 10 mg oral tablet take 1 tablet (10 mg) by oral route once daily   Active  4/21/25  Provider, MD Grady   fluticasone (FLONASE) 50 MCG/ACT nasal spray Administer 2 sprays into the nostril(s) as directed by provider Daily. 11/1/24 4/21/25  Kar Castro MD   predniSONE (DELTASONE) 20 MG tablet Take 1 tablet by mouth Daily. 12/20/24 4/21/25  Kar Castro MD        Social History:   Social History     Tobacco Use    Smoking status: Never    Smokeless tobacco: Never   Vaping Use    Vaping status: Never Used   Substance Use Topics    Alcohol use: Never    Drug use: Never         Review of Systems:  Review of Systems   Constitutional:  Positive for chills. Negative for fever.   HENT:  Positive for sinus pain. Negative for congestion.    Eyes: Negative.    Respiratory: Negative.  Negative for cough.    Cardiovascular: Negative.    Gastrointestinal:  Positive for nausea and vomiting.   Endocrine: Negative.    Genitourinary: Negative.    Musculoskeletal: Negative.    Skin: Negative.    Allergic/Immunologic: Negative.    Neurological:  Positive for dizziness and headaches.   Hematological: Negative.    Psychiatric/Behavioral: Negative.          Physical Exam:  /91 (BP Location: Right arm, Patient Position: Standing)   Pulse 74   Temp 97.3 °F (36.3 °C) (Oral)   Resp 16   Ht 160 cm (63\")   Wt 56.3 kg (124 lb 1.9 oz)   SpO2 96%   BMI 21.99 kg/m²         Physical Exam  Vitals and nursing note reviewed.   Constitutional:       Appearance: Normal appearance.   HENT:      Head: Normocephalic and atraumatic.      Right Ear: Tympanic membrane normal.      Left Ear: Tympanic membrane normal.      Nose: Nose normal.      Right Sinus: No maxillary sinus tenderness or frontal sinus tenderness.      Left Sinus: No " maxillary sinus tenderness or frontal sinus tenderness.      Mouth/Throat:      Mouth: Mucous membranes are moist.   Eyes:      General: No scleral icterus.        Right eye: No discharge.         Left eye: No discharge.      Extraocular Movements: Extraocular movements intact.      Right eye: Normal extraocular motion and no nystagmus.      Left eye: Normal extraocular motion and no nystagmus.      Conjunctiva/sclera: Conjunctivae normal.      Pupils: Pupils are equal, round, and reactive to light.   Cardiovascular:      Rate and Rhythm: Normal rate and regular rhythm.      Heart sounds: Normal heart sounds.   Pulmonary:      Effort: Pulmonary effort is normal.      Breath sounds: Normal breath sounds.   Abdominal:      General: Abdomen is flat. Bowel sounds are normal.      Palpations: Abdomen is soft.   Musculoskeletal:         General: Normal range of motion.      Cervical back: Normal range of motion and neck supple.   Skin:     General: Skin is warm and dry.   Neurological:      General: No focal deficit present.      Mental Status: She is alert and oriented to person, place, and time.      Cranial Nerves: No facial asymmetry.      Sensory: Sensation is intact. No sensory deficit.      Motor: No weakness.      Coordination: Coordination normal.   Psychiatric:         Mood and Affect: Mood normal.         Behavior: Behavior normal.                  Medical Decision Making:      Comorbidities that affect care:    None    External Notes reviewed:    Previous Clinic Note: Urgent care visit 4/21/2025 for intractable headache and vomiting      The following orders were placed and all results were independently analyzed by me:  Orders Placed This Encounter   Procedures    COVID-19, FLU A/B, RSV PCR 1 HR TAT - Swab, Nasopharynx    Urine Culture - Urine,    XR Chest 1 View    CT Head Without Contrast    [place] Draw    Comprehensive Metabolic Panel    High Sensitivity Troponin T    Magnesium    Urinalysis With  Microscopic If Indicated (No Culture) - Urine, Clean Catch    CBC Auto Differential    Urinalysis, Microscopic Only - Urine, Clean Catch    NPO Diet NPO Type: Strict NPO    Undress & Gown    Continuous Pulse Oximetry    Vital Signs    Orthostatic Blood Pressure    Orthostatic Vitals (Blood Pressure & Heart Rate)    Oxygen Therapy- Nasal Cannula; Titrate 1-6 LPM Per SpO2; 90 - 95%    POC Glucose Once    ECG 12 Lead Other; Dizziness    Insert Peripheral IV    Fall Precautions    CBC & Differential    Green Top (Gel)    Lavender Top    Gold Top - SST    Light Blue Top       Medications Given in the Emergency Department:  Medications   sodium chloride 0.9 % flush 10 mL (has no administration in time range)   sodium chloride 0.9 % bolus 1,000 mL (0 mL Intravenous Stopped 4/21/25 1931)   ketorolac (TORADOL) injection 30 mg (30 mg Intravenous Given 4/21/25 1716)   metoclopramide (REGLAN) injection 10 mg (10 mg Intravenous Given 4/21/25 1724)   diphenhydrAMINE (BENADRYL) injection 25 mg (25 mg Intravenous Given 4/21/25 1714)        ED Course:    The patient was initially evaluated in the triage area where orders were placed. The patient was later dispositioned by Raoul Penny PA-C.      The patient was advised to stay for completion of workup which includes but is not limited to communication of labs and radiological results, reassessment and plan. The patient was advised that leaving prior to disposition by a provider could result in critical findings that are not communicated to the patient.     ED Course as of 04/21/25 2013 Mon Apr 21, 2025   1552 PROVIDER IN TRIAGE  Patient was evaluated by Maged starr PA-C. Orders were placed and awaiting final results and disposition.   [MV]   2000 Patient states her headache has resolved and she feels a lot better. [AJ]      ED Course User Index  [AJ] Raoul Penny PA-C  [MV] Maged Zhu PA       Labs:    Lab Results (last 24 hours)       Procedure Component  Value Units Date/Time    CBC & Differential [[id number]]  (Abnormal) Collected: 04/21/25 1553    Specimen: Blood from Arm, Right Updated: 04/21/25 1602    Narrative:      The following orders were created for panel order CBC & Differential.  Procedure                               Abnormality         Status                     ---------                               -----------         ------                     CBC Auto Differential[[id number]]        Abnormal            Final result                 Please view results for these tests on the individual orders.    Comprehensive Metabolic Panel [[id number]]  (Abnormal) Collected: 04/21/25 1553    Specimen: Blood from Arm, Right Updated: 04/21/25 1634     Glucose 142 mg/dL      BUN 6 mg/dL      Creatinine 0.56 mg/dL      Sodium 138 mmol/L      Potassium 4.2 mmol/L      Chloride 99 mmol/L      CO2 25.1 mmol/L      Calcium 9.6 mg/dL      Total Protein 7.4 g/dL      Albumin 4.7 g/dL      ALT (SGPT) 12 U/L      AST (SGOT) 21 U/L      Alkaline Phosphatase 81 U/L      Total Bilirubin 0.9 mg/dL      Globulin 2.7 gm/dL      A/G Ratio 1.7 g/dL      BUN/Creatinine Ratio 10.7     Anion Gap 13.9 mmol/L      eGFR 100.8 mL/min/1.73     Narrative:      GFR Categories in Chronic Kidney Disease (CKD)      GFR Category          GFR (mL/min/1.73)    Interpretation  G1                     90 or greater         Normal or high (1)  G2                      60-89                Mild decrease (1)  G3a                   45-59                Mild to moderate decrease  G3b                   30-44                Moderate to severe decrease  G4                    15-29                Severe decrease  G5                    14 or less           Kidney failure          (1)In the absence of evidence of kidney disease, neither GFR category G1 or G2 fulfill the criteria for CKD.    eGFR calculation 2021 CKD-EPI creatinine equation, which does not include race as a factor    High Sensitivity Troponin T  [[id number]]  (Normal) Collected: 04/21/25 1553    Specimen: Blood from Arm, Right Updated: 04/21/25 1634     HS Troponin T <6 ng/L     Narrative:      High Sensitive Troponin T Reference Range:  <14.0 ng/L- Negative Female for AMI  <22.0 ng/L- Negative Male for AMI  >=14 - Abnormal Female indicating possible myocardial injury.  >=22 - Abnormal Male indicating possible myocardial injury.   Clinicians would have to utilize clinical acumen, EKG, Troponin, and serial changes to determine if it is an Acute Myocardial Infarction or myocardial injury due to an underlying chronic condition.         Magnesium [[id number]]  (Normal) Collected: 04/21/25 1553    Specimen: Blood from Arm, Right Updated: 04/21/25 1634     Magnesium 2.2 mg/dL     CBC Auto Differential [[id number]]  (Abnormal) Collected: 04/21/25 1553    Specimen: Blood from Arm, Right Updated: 04/21/25 1602     WBC 5.80 10*3/mm3      RBC 4.58 10*6/mm3      Hemoglobin 14.2 g/dL      Hematocrit 41.6 %      MCV 90.8 fL      MCH 31.0 pg      MCHC 34.1 g/dL      RDW 12.4 %      RDW-SD 41.0 fl      MPV 8.9 fL      Platelets 261 10*3/mm3      Neutrophil % 80.1 %      Lymphocyte % 15.0 %      Monocyte % 4.5 %      Eosinophil % 0.0 %      Basophil % 0.2 %      Immature Grans % 0.2 %      Neutrophils, Absolute 4.65 10*3/mm3      Lymphocytes, Absolute 0.87 10*3/mm3      Monocytes, Absolute 0.26 10*3/mm3      Eosinophils, Absolute 0.00 10*3/mm3      Basophils, Absolute 0.01 10*3/mm3      Immature Grans, Absolute 0.01 10*3/mm3      nRBC 0.0 /100 WBC     COVID-19, FLU A/B, RSV PCR 1 HR TAT - Swab, Nasopharynx [[id number]]  (Normal) Collected: 04/21/25 1719    Specimen: Swab from Nasopharynx Updated: 04/21/25 1820     COVID19 Not Detected     Influenza A PCR Not Detected     Influenza B PCR Not Detected     RSV, PCR Not Detected    Narrative:      Fact sheet for providers: https://www.fda.gov/media/937527/download    Fact sheet for patients:  https://www.fda.gov/media/874243/download    Test performed by PCR.    Urinalysis With Microscopic If Indicated (No Culture) - Urine, Clean Catch [[id number]]  (Abnormal) Collected: 04/21/25 1818    Specimen: Urine, Clean Catch Updated: 04/21/25 1840     Color, UA Yellow     Appearance, UA Clear     pH, UA 7.0     Specific Gravity, UA 1.007     Glucose, UA Negative     Ketones, UA Negative     Bilirubin, UA Negative     Blood, UA Negative     Protein, UA Negative     Leuk Esterase, UA Small (1+)     Nitrite, UA Negative     Urobilinogen, UA 0.2 E.U./dL    Urinalysis, Microscopic Only - Urine, Clean Catch [[id number]]  (Abnormal) Collected: 04/21/25 1818    Specimen: Urine, Clean Catch Updated: 04/21/25 1840     RBC, UA 0-2 /HPF      WBC, UA 3-5 /HPF      Bacteria, UA None Seen /HPF      Squamous Epithelial Cells, UA 0-2 /HPF      Hyaline Casts, UA None Seen /LPF      Methodology Automated Microscopy    Urine Culture - Urine, Urine, Clean Catch [[id number]] Collected: 04/21/25 1818    Specimen: Urine, Clean Catch Updated: 04/21/25 2002             Imaging:    CT Head Without Contrast  Result Date: 4/21/2025  CT HEAD WO CONTRAST Date of Exam: 4/21/2025 6:16 PM EDT Indication: ha/n/v/dizzi. Comparison: None available. Technique: Axial CT images were obtained of the head without contrast administration.  Reconstructed coronal and sagittal images were also obtained. Automated exposure control and iterative construction methods were used. Findings: No acute intracranial hemorrhage.Intact appearing gray-white differentiation.No extra-axial fluid collection.No significant mass effect. No hydrocephalus. Brain volume appears age-appropriate. Mild scattered areas of periventricular and subcortical white matter hypoattenuation, nonspecific, perhaps from small vessel ischemic/hypertensive changes in a patient of this age.There are intracranial atherosclerotic calcifications. Mild scattered mucosal thickening in the paranasal  sinuses.Mastoid air cells are essentially clear.Included globes and orbits appear unremarkable by CT. No acute or aggressive appearing bony or extracranial soft tissue process.     Impression: No acute intracranial findings. Electronically Signed: Michael Rowe MD  4/21/2025 7:08 PM EDT  Workstation ID: [id number]    XR Chest 1 View  Result Date: 4/21/2025  XR CHEST 1 VW Date of Exam: 4/21/2025 4:18 PM EDT Indication: Weak/Dizzy/AMS triage protocol Comparison: None available. Findings: There is a calcified granuloma in the right upper lung. Lungs are well-expanded and otherwise appear clear. No pneumothorax or large pleural effusion is seen. Cardiomediastinal contours appear within normal months.     Impression: No acute cardiopulmonary abnormality is identified. Electronically Signed: Lisandra Horan MD  4/21/2025 4:28 PM EDT  Workstation ID: [id number]        Differential Diagnosis and Discussion:      Headache: Differential diagnosis includes but is not limited to migraine, cluster headache, hypertension, tumor, subarachnoid bleeding, pseudotumor cerebri, temporal arteritis, infections, tension headache, and TMJ syndrome.    PROCEDURES:    Labs were collected in the emergency department and all labs were reviewed and interpreted by me.  X-ray were performed in the emergency department and all X-ray impressions were independently interpreted by me.  An EKG was performed and the EKG was interpreted by me.  An EKG was performed and the EKG was interpreted by supervising attending.  CT scan was performed in the emergency department and the CT scan radiology impression was interpreted by me.    ECG 12 Lead Other; Dizziness   Preliminary Result   HEART RATE=71  bpm   RR Tybeoenc=196  ms   WV Yhyvlcid=925  ms   P Horizontal Axis=3  deg   P Front Axis=52  deg   QRSD Interval=77  ms   QT Qvyrjydt=136  ms   RHoD=574  ms   QRS Axis=42  deg   T Wave Axis=58  deg   - BORDERLINE ECG -   Sinus rhythm   Borderline short WV  interval   Consider left ventricular hypertrophy   Date and Time of Study:2025-04-21 16:00:05           Procedures    MDM     Amount and/or Complexity of Data Reviewed  Clinical lab tests: reviewed  Tests in the radiology section of CPT®: reviewed  Tests in the medicine section of CPT®: reviewed                     Patient Care Considerations:    SEPSIS was considered but is NOT present in the emergency department as SIRS criteria is not present.      Consultants/Shared Management Plan:    None    Social Determinants of Health:    Patient is independent, reliable, and has access to care.       Disposition and Care Coordination:    Discharged: I considered escalation of care by admitting this patient to the hospital, however patient's headache has resolved    I have explained the patient´s condition, diagnoses and treatment plan based on the information available to me at this time. I have answered questions and addressed any concerns. The patient has a good  understanding of the patient´s diagnosis, condition, and treatment plan as can be expected at this point. The vital signs have been stable. The patient´s condition is stable and appropriate for discharge from the emergency department.      The patient will pursue further outpatient evaluation with the primary care physician or other designated or consulting physician as outlined in the discharge instructions. They are agreeable to this plan of care and follow-up instructions have been explained in detail. The patient has received these instructions in written format and has expressed an understanding of the discharge instructions. The patient is aware that any significant change in condition or worsening of symptoms should prompt an immediate return to this or the closest emergency department or call to 911.  I have explained discharge medications and the need for follow up with the patient/caretakers. This was also printed in the discharge instructions. Patient  was discharged with the following medications and follow up:      Medication List        New Prescriptions      cephalexin 500 MG capsule  Commonly known as: KEFLEX  Take 1 capsule by mouth 2 (Two) Times a Day for 7 days.               Where to Get Your Medications        These medications were sent to [facility] [address] [place] [address] - [address] - [phone] [facility] [phone]   [address] [facility] [address]      Phone: [phone]   cephalexin 500 MG capsule      Kar Castro MD  [address] [facility] [address]  [facility] [id number] [phone]             Final diagnoses:   Nonintractable headache, unspecified chronicity pattern, unspecified headache type   Acute UTI        ED Disposition       ED Disposition   Discharge    Condition   Stable    Comment   --               This medical record created using voice recognition software.             Raoul Penny PA-C  04/21/25 2013

## 2025-04-22 LAB — BACTERIA SPEC AEROBE CULT: NO GROWTH

## 2025-04-22 NOTE — DISCHARGE INSTRUCTIONS
Your lab work today shows that you are a little bit dehydrated, you need to drink more water.  Your urine shows a little bit of white blood cells in it, I have sent antibiotics to the pharmacy and sent your urine off for culture.  Your COVID and flu swab are negative.  CT of your head and chest x-ray negative.    Please follow-up with your PCP

## 2025-04-24 LAB
QT INTERVAL: 394 MS
QTC INTERVAL: 430 MS

## 2025-05-02 ENCOUNTER — OFFICE VISIT (OUTPATIENT)
Dept: FAMILY MEDICINE CLINIC | Facility: CLINIC | Age: 65
End: 2025-05-02
Payer: MEDICARE

## 2025-05-02 VITALS
BODY MASS INDEX: 22.15 KG/M2 | OXYGEN SATURATION: 98 % | TEMPERATURE: 98 F | HEART RATE: 57 BPM | SYSTOLIC BLOOD PRESSURE: 114 MMHG | WEIGHT: 125 LBS | RESPIRATION RATE: 16 BRPM | DIASTOLIC BLOOD PRESSURE: 66 MMHG | HEIGHT: 63 IN

## 2025-05-02 DIAGNOSIS — R09.81 SINUS CONGESTION: Primary | ICD-10-CM

## 2025-05-02 DIAGNOSIS — J30.2 SEASONAL ALLERGIES: ICD-10-CM

## 2025-05-02 DIAGNOSIS — R73.03 PREDIABETES: ICD-10-CM

## 2025-05-02 DIAGNOSIS — E78.2 MIXED HYPERLIPIDEMIA: ICD-10-CM

## 2025-05-02 PROCEDURE — 1126F AMNT PAIN NOTED NONE PRSNT: CPT | Performed by: STUDENT IN AN ORGANIZED HEALTH CARE EDUCATION/TRAINING PROGRAM

## 2025-05-02 PROCEDURE — 99213 OFFICE O/P EST LOW 20 MIN: CPT | Performed by: STUDENT IN AN ORGANIZED HEALTH CARE EDUCATION/TRAINING PROGRAM

## 2025-05-02 PROCEDURE — 1160F RVW MEDS BY RX/DR IN RCRD: CPT | Performed by: STUDENT IN AN ORGANIZED HEALTH CARE EDUCATION/TRAINING PROGRAM

## 2025-05-02 PROCEDURE — 1159F MED LIST DOCD IN RCRD: CPT | Performed by: STUDENT IN AN ORGANIZED HEALTH CARE EDUCATION/TRAINING PROGRAM

## 2025-05-02 RX ORDER — AZITHROMYCIN 250 MG/1
TABLET, FILM COATED ORAL
Qty: 6 TABLET | Refills: 0 | Status: SHIPPED | OUTPATIENT
Start: 2025-05-02

## 2025-05-02 RX ORDER — PREDNISONE 20 MG/1
20 TABLET ORAL 2 TIMES DAILY
Qty: 10 TABLET | Refills: 0 | Status: SHIPPED | OUTPATIENT
Start: 2025-05-02

## 2025-05-02 NOTE — PROGRESS NOTES
"Chief Complaint  Following up on recent ER visit/sinus congestion and drainage    Subjective         Kasey Soler is a 64 y.o. female who presents to [facility]    64 years old comes to the clinic today to follow-up on sinus congestion and drainage.    Patient was seen in the ER recently few weeks ago, treated with Keflex for sinus infection and possible UTI.  Patient reports no significant improvement with her symptoms.  Still complaining of some postnasal drip and sinus headaches.  Does not report any fever/chest pain or shortness of breath.    Physically active otherwise, no GI symptoms or any vision changes/palpitations/dizziness.    12 point review of systems are unremarkable otherwise    Not on any prescription medication    Objective   Vital Signs:   Vitals:    05/02/25 0704   BP: 114/66   Pulse: 57   Resp: 16   Temp: 98 °F (36.7 °C)   TempSrc: Temporal   SpO2: 98%   Weight: 56.7 kg (125 lb)   Height: 160 cm (63\")   PainSc: 0-No pain      Body mass index is 22.14 kg/m².   Wt Readings from Last 3 Encounters:   05/02/25 56.7 kg (125 lb)   04/21/25 56.3 kg (124 lb 1.9 oz)   04/21/25 56.3 kg (124 lb 3.2 oz)      BP Readings from Last 3 Encounters:   05/02/25 114/66   04/21/25 131/91   04/21/25 144/100        Patient Care Team:  Kar Castro MD as PCP - General (Family Medicine)     Physical Exam  Vitals reviewed.   Constitutional:       Appearance: Normal appearance. She is well-developed.   HENT:      Head: Normocephalic and atraumatic.      Right Ear: External ear normal.      Left Ear: External ear normal.      Mouth/Throat:      Pharynx: Postnasal drip present. No oropharyngeal exudate.   Eyes:      Conjunctiva/sclera: Conjunctivae normal.      Pupils: Pupils are equal, round, and reactive to light.   Cardiovascular:      Rate and Rhythm: Normal rate and regular rhythm.      Heart sounds: No murmur heard.     No friction rub. No gallop.   Pulmonary:      Effort: " Pulmonary effort is normal.      Breath sounds: Normal breath sounds. No wheezing or rhonchi.   Abdominal:      General: Bowel sounds are normal. There is no distension.      Palpations: Abdomen is soft.      Tenderness: There is no abdominal tenderness.   Skin:     General: Skin is warm and dry.   Neurological:      Mental Status: She is alert and oriented to person, place, and time.      Cranial Nerves: No cranial nerve deficit.   Psychiatric:         Mood and Affect: Mood and affect normal.         Behavior: Behavior normal.         Thought Content: Thought content normal.         Judgment: Judgment normal.            BMI is within normal parameters. No other follow-up for BMI required.              Assessment and Plan   Diagnoses and all orders for this visit:    1. Sinus congestion (Primary)  -     azithromycin (Zithromax Z-Agustín) 250 MG tablet; Take 2 tablets by mouth on day 1, then 1 tablet daily on days 2-5  Dispense: 6 tablet; Refill: 0  -     predniSONE (DELTASONE) 20 MG tablet; Take 1 tablet by mouth 2 (Two) Times a Day.  Dispense: 10 tablet; Refill: 0  -     TSH Rfx On Abnormal To Free T4; Future  -     CBC & Differential; Future  -     Comprehensive Metabolic Panel; Future  -     Hemoglobin A1c; Future  -     Lipid Panel; Future  -     Urinalysis With Microscopic - Urine, Clean Catch; Future    2. Mixed hyperlipidemia  -     TSH Rfx On Abnormal To Free T4; Future  -     CBC & Differential; Future  -     Comprehensive Metabolic Panel; Future  -     Hemoglobin A1c; Future  -     Lipid Panel; Future  -     Urinalysis With Microscopic - Urine, Clean Catch; Future    3. Prediabetes  -     TSH Rfx On Abnormal To Free T4; Future  -     CBC & Differential; Future  -     Comprehensive Metabolic Panel; Future  -     Hemoglobin A1c; Future  -     Lipid Panel; Future  -     Urinalysis With Microscopic - Urine, Clean Catch; Future    4. Seasonal allergies  -     TSH Rfx On Abnormal To Free T4; Future  -     CBC &  Differential; Future  -     Comprehensive Metabolic Panel; Future  -     Hemoglobin A1c; Future  -     Lipid Panel; Future  -     Urinalysis With Microscopic - Urine, Clean Catch; Future      Based on patient's symptoms and physical findings, I do not think patient needs any antibiotics right away.  I have prescribed azithromycin and prednisone as a delayed prescription to use if not improved in next few days or any worsening.    Patient understands and agrees with the plan    Tobacco Use: Low Risk  (5/2/2025)    Patient History     Smoking Tobacco Use: Never     Smokeless Tobacco Use: Never     Passive Exposure: Not on file            Follow Up   Return in about 7 months (around 11/18/2025) for Medicare Wellness.  Patient was given instructions and counseling regarding her condition or for health maintenance advice. Please see specific information pulled into the AVS if appropriate.

## 2025-06-04 ENCOUNTER — TELEPHONE (OUTPATIENT)
Dept: FAMILY MEDICINE CLINIC | Facility: CLINIC | Age: 65
End: 2025-06-04
Payer: MEDICARE

## 2025-06-04 DIAGNOSIS — Z12.11 SCREENING FOR MALIGNANT NEOPLASM OF COLON: Primary | ICD-10-CM

## 2025-06-04 NOTE — TELEPHONE ENCOUNTER
Caller: Kasey Soler    Relationship to patient: Self    Best call back number: [phone]    Patient is needing: PATIENT CALLED REQUESTING TO SPEAK WITH CLINICAL STAFF. PATIENT STATES SHE RECEIVED A MESSAGE ON [employer] STATING HER PROVIDER HAD ORDERED A COLOGUARD TEST. PATIENT STATES SHE HAS TOLD THE OFFICE MULTIPLE TIMES SHE DOES NOT WANT THIS TEST AND TO PLEASE CANCEL THE ORDER. PATIENT WOULD LIKE A CALLBACK TO DISCUSS.